# Patient Record
Sex: MALE | Race: WHITE | NOT HISPANIC OR LATINO | URBAN - METROPOLITAN AREA
[De-identification: names, ages, dates, MRNs, and addresses within clinical notes are randomized per-mention and may not be internally consistent; named-entity substitution may affect disease eponyms.]

---

## 2018-06-14 PROBLEM — Z00.00 ENCOUNTER FOR PREVENTIVE HEALTH EXAMINATION: Status: ACTIVE | Noted: 2018-06-14

## 2018-06-28 ENCOUNTER — OUTPATIENT (OUTPATIENT)
Dept: OUTPATIENT SERVICES | Facility: HOSPITAL | Age: 63
LOS: 1 days | End: 2018-06-28
Payer: MEDICAID

## 2018-06-28 VITALS
HEART RATE: 82 BPM | WEIGHT: 217.6 LBS | RESPIRATION RATE: 16 BRPM | SYSTOLIC BLOOD PRESSURE: 142 MMHG | TEMPERATURE: 98 F | HEIGHT: 70 IN | DIASTOLIC BLOOD PRESSURE: 82 MMHG

## 2018-06-28 DIAGNOSIS — Z98.890 OTHER SPECIFIED POSTPROCEDURAL STATES: Chronic | ICD-10-CM

## 2018-06-28 DIAGNOSIS — N32.1 VESICOINTESTINAL FISTULA: ICD-10-CM

## 2018-06-28 DIAGNOSIS — Z29.9 ENCOUNTER FOR PROPHYLACTIC MEASURES, UNSPECIFIED: ICD-10-CM

## 2018-06-28 DIAGNOSIS — Z01.818 ENCOUNTER FOR OTHER PREPROCEDURAL EXAMINATION: ICD-10-CM

## 2018-06-28 LAB
APPEARANCE UR: CLEAR — SIGNIFICANT CHANGE UP
BACTERIA # UR AUTO: ABNORMAL
BILIRUB UR-MCNC: NEGATIVE — SIGNIFICANT CHANGE UP
BLD GP AB SCN SERPL QL: SIGNIFICANT CHANGE UP
COLOR SPEC: YELLOW — SIGNIFICANT CHANGE UP
DIFF PNL FLD: ABNORMAL
EPI CELLS # UR: SIGNIFICANT CHANGE UP
GLUCOSE UR QL: NEGATIVE MG/DL — SIGNIFICANT CHANGE UP
KETONES UR-MCNC: NEGATIVE — SIGNIFICANT CHANGE UP
LEUKOCYTE ESTERASE UR-ACNC: ABNORMAL
NITRITE UR-MCNC: NEGATIVE — SIGNIFICANT CHANGE UP
PH UR: 5 — SIGNIFICANT CHANGE UP (ref 5–8)
PROT UR-MCNC: 15 MG/DL
RBC CASTS # UR COMP ASSIST: ABNORMAL /HPF (ref 0–4)
SP GR SPEC: 1.02 — SIGNIFICANT CHANGE UP (ref 1.01–1.02)
TYPE + AB SCN PNL BLD: SIGNIFICANT CHANGE UP
UROBILINOGEN FLD QL: NEGATIVE MG/DL — SIGNIFICANT CHANGE UP
WBC UR QL: SIGNIFICANT CHANGE UP

## 2018-06-28 PROCEDURE — 86901 BLOOD TYPING SEROLOGIC RH(D): CPT

## 2018-06-28 PROCEDURE — 81001 URINALYSIS AUTO W/SCOPE: CPT

## 2018-06-28 PROCEDURE — 86850 RBC ANTIBODY SCREEN: CPT

## 2018-06-28 PROCEDURE — 86900 BLOOD TYPING SEROLOGIC ABO: CPT

## 2018-06-28 PROCEDURE — 87086 URINE CULTURE/COLONY COUNT: CPT

## 2018-06-28 PROCEDURE — 36415 COLL VENOUS BLD VENIPUNCTURE: CPT

## 2018-06-28 PROCEDURE — G0463: CPT

## 2018-06-28 PROCEDURE — 87186 SC STD MICRODIL/AGAR DIL: CPT

## 2018-06-28 RX ORDER — SODIUM CHLORIDE 9 MG/ML
3 INJECTION INTRAMUSCULAR; INTRAVENOUS; SUBCUTANEOUS EVERY 8 HOURS
Qty: 0 | Refills: 0 | Status: DISCONTINUED | OUTPATIENT
Start: 2018-07-05 | End: 2018-07-07

## 2018-06-28 NOTE — H&P PST ADULT - ASSESSMENT
62 yo male with diverticulitis and colovesical fistula.    CAPRINI SCORE [CLOT]    AGE RELATED RISK FACTORS                                                       MOBILITY RELATED FACTORS  [ ] Age 41-60 years                                            (1 Point)                  [ ] Bed rest                                                        (1 Point)  [ x ] Age: 61-74 years                                           (2 Points)                 [ ] Plaster cast                                                   (2 Points)  [ ] Age= 75 years                                              (3 Points)                 [ ] Bed bound for more than 72 hours                 (2 Points)    DISEASE RELATED RISK FACTORS                                               GENDER SPECIFIC FACTORS  [ ] Edema in the lower extremities                       (1 Point)                  [ ] Pregnancy                                                     (1 Point)  [ ] Varicose veins                                               (1 Point)                  [ ] Post-partum < 6 weeks                                   (1 Point)             [x ] BMI > 25 Kg/m2                                            (1 Point)                  [ ] Hormonal therapy  or oral contraception          (1 Point)                 [ ] Sepsis (in the previous month)                        (1 Point)                  [ ] History of pregnancy complications                 (1 point)  [ ] Pneumonia or serious lung disease                                               [ ] Unexplained or recurrent                     (1 Point)           (in the previous month)                               (1 Point)  [ ] Abnormal pulmonary function test                     (1 Point)                 SURGERY RELATED RISK FACTORS  [ ] Acute myocardial infarction                              (1 Point)                 [ ]  Section                                             (1 Point)  [ ] Congestive heart failure (in the previous month)  (1 Point)               [ ] Minor surgery                                                  (1 Point)   [ x] Inflammatory bowel disease                             (1 Point)                 [ ] Arthroscopic surgery                                        (2 Points)  [ ] Central venous access                                      (2 Points)                [x ] General surgery lasting more than 45 minutes   (2 Points)       [ ] Stroke (in the previous month)                          (5 Points)               [ ] Elective arthroplasty                                         (5 Points)                                                                                                                                               HEMATOLOGY RELATED FACTORS                                                 TRAUMA RELATED RISK FACTORS  [ ] Prior episodes of VTE                                     (3 Points)                 [ ] Fracture of the hip, pelvis, or leg                       (5 Points)  [ ] Positive family history for VTE                         (3 Points)                 [ ] Acute spinal cord injury (in the previous month)  (5 Points)  [ ] Prothrombin 32809 A                                     (3 Points)                 [ ] Paralysis  (less than 1 month)                             (5 Points)  [ ] Factor V Leiden                                             (3 Points)                  [ ] Multiple Trauma within 1 month                        (5 Points)  [ ] Lupus anticoagulants                                     (3 Points)                                                           [ ] Anticardiolipin antibodies                               (3 Points)                                                       [ ] High homocysteine in the blood                      (3 Points)                                             [ ] Other congenital or acquired thrombophilia      (3 Points)                                                [ ] Heparin induced thrombocytopenia                  (3 Points)                                          Total Score [      6    ]

## 2018-06-28 NOTE — H&P PST ADULT - NSANTHOSAYNRD_GEN_A_CORE
No. EULA screening performed.  STOP BANG Legend: 0-2 = LOW Risk; 3-4 = INTERMEDIATE Risk; 5-8 = HIGH Risk

## 2018-06-28 NOTE — PATIENT PROFILE ADULT. - LEARNING ASSESSMENT (PATIENT) ADDITIONAL COMMENTS
Educated on pain scale and management.  Tips for safer surgery and per op instructions given.  Verbalized understanding.

## 2018-06-28 NOTE — H&P PST ADULT - PROBLEM SELECTOR PLAN 1
Laparoscopic possible open resection of colovesical fistula and all related procedures; stents by Dr. Padilla.  Preop medical eval.

## 2018-07-02 ENCOUNTER — APPOINTMENT (OUTPATIENT)
Dept: SURGERY | Facility: CLINIC | Age: 63
End: 2018-07-02

## 2018-07-02 RX ORDER — CEFOTETAN DISODIUM 1 G
2 VIAL (EA) INJECTION ONCE
Qty: 0 | Refills: 0 | Status: DISCONTINUED | OUTPATIENT
Start: 2018-07-05 | End: 2018-07-05

## 2018-07-05 ENCOUNTER — INPATIENT (INPATIENT)
Facility: HOSPITAL | Age: 63
LOS: 1 days | Discharge: HOME | DRG: 674 | End: 2018-07-07
Attending: SURGERY | Admitting: SURGERY
Payer: MEDICAID

## 2018-07-05 ENCOUNTER — RESULT REVIEW (OUTPATIENT)
Age: 63
End: 2018-07-05

## 2018-07-05 VITALS
SYSTOLIC BLOOD PRESSURE: 124 MMHG | RESPIRATION RATE: 16 BRPM | OXYGEN SATURATION: 99 % | HEART RATE: 58 BPM | TEMPERATURE: 98 F | WEIGHT: 218.26 LBS | HEIGHT: 70 IN | DIASTOLIC BLOOD PRESSURE: 74 MMHG

## 2018-07-05 DIAGNOSIS — K57.32 DIVERTICULITIS OF LARGE INTESTINE WITHOUT PERFORATION OR ABSCESS WITHOUT BLEEDING: ICD-10-CM

## 2018-07-05 DIAGNOSIS — Z98.890 OTHER SPECIFIED POSTPROCEDURAL STATES: Chronic | ICD-10-CM

## 2018-07-05 LAB
GLUCOSE BLDC GLUCOMTR-MCNC: 151 MG/DL — HIGH (ref 70–99)
GLUCOSE BLDC GLUCOMTR-MCNC: 167 MG/DL — HIGH (ref 70–99)
GLUCOSE BLDC GLUCOMTR-MCNC: 99 MG/DL — SIGNIFICANT CHANGE UP (ref 70–99)

## 2018-07-05 PROCEDURE — 52005 CYSTO W/URTRL CATHJ: CPT | Mod: 59

## 2018-07-05 PROCEDURE — 88307 TISSUE EXAM BY PATHOLOGIST: CPT | Mod: 26

## 2018-07-05 PROCEDURE — 88305 TISSUE EXAM BY PATHOLOGIST: CPT | Mod: 26

## 2018-07-05 RX ORDER — SODIUM CHLORIDE 9 MG/ML
1000 INJECTION, SOLUTION INTRAVENOUS
Qty: 0 | Refills: 0 | Status: DISCONTINUED | OUTPATIENT
Start: 2018-07-05 | End: 2018-07-07

## 2018-07-05 RX ORDER — GENTAMICIN SULFATE 40 MG/ML
80 VIAL (ML) INJECTION ONCE
Qty: 0 | Refills: 0 | Status: DISCONTINUED | OUTPATIENT
Start: 2018-07-05 | End: 2018-07-05

## 2018-07-05 RX ORDER — ONDANSETRON 8 MG/1
4 TABLET, FILM COATED ORAL EVERY 6 HOURS
Qty: 0 | Refills: 0 | Status: DISCONTINUED | OUTPATIENT
Start: 2018-07-05 | End: 2018-07-07

## 2018-07-05 RX ORDER — ACETAMINOPHEN 500 MG
1000 TABLET ORAL ONCE
Qty: 0 | Refills: 0 | Status: COMPLETED | OUTPATIENT
Start: 2018-07-05 | End: 2018-07-05

## 2018-07-05 RX ORDER — KETOROLAC TROMETHAMINE 30 MG/ML
30 SYRINGE (ML) INJECTION ONCE
Qty: 0 | Refills: 0 | Status: DISCONTINUED | OUTPATIENT
Start: 2018-07-05 | End: 2018-07-05

## 2018-07-05 RX ORDER — HYDROXYZINE HCL 10 MG
50 TABLET ORAL ONCE
Qty: 0 | Refills: 0 | Status: COMPLETED | OUTPATIENT
Start: 2018-07-05 | End: 2018-07-05

## 2018-07-05 RX ORDER — SODIUM CHLORIDE 9 MG/ML
1000 INJECTION, SOLUTION INTRAVENOUS
Qty: 0 | Refills: 0 | Status: DISCONTINUED | OUTPATIENT
Start: 2018-07-05 | End: 2018-07-05

## 2018-07-05 RX ORDER — NALOXONE HYDROCHLORIDE 4 MG/.1ML
0.1 SPRAY NASAL
Qty: 0 | Refills: 0 | Status: DISCONTINUED | OUTPATIENT
Start: 2018-07-05 | End: 2018-07-07

## 2018-07-05 RX ORDER — ALVIMOPAN 12 MG/1
12 CAPSULE ORAL
Qty: 0 | Refills: 0 | Status: DISCONTINUED | OUTPATIENT
Start: 2018-07-05 | End: 2018-07-07

## 2018-07-05 RX ORDER — ONDANSETRON 8 MG/1
4 TABLET, FILM COATED ORAL ONCE
Qty: 0 | Refills: 0 | Status: DISCONTINUED | OUTPATIENT
Start: 2018-07-05 | End: 2018-07-05

## 2018-07-05 RX ORDER — HYDROMORPHONE HYDROCHLORIDE 2 MG/ML
0.5 INJECTION INTRAMUSCULAR; INTRAVENOUS; SUBCUTANEOUS
Qty: 0 | Refills: 0 | Status: DISCONTINUED | OUTPATIENT
Start: 2018-07-05 | End: 2018-07-05

## 2018-07-05 RX ORDER — ENOXAPARIN SODIUM 100 MG/ML
40 INJECTION SUBCUTANEOUS EVERY 24 HOURS
Qty: 0 | Refills: 0 | Status: DISCONTINUED | OUTPATIENT
Start: 2018-07-06 | End: 2018-07-07

## 2018-07-05 RX ORDER — BUPIVACAINE 13.3 MG/ML
20 INJECTION, SUSPENSION, LIPOSOMAL INFILTRATION ONCE
Qty: 0 | Refills: 0 | Status: DISCONTINUED | OUTPATIENT
Start: 2018-07-05 | End: 2018-07-05

## 2018-07-05 RX ORDER — HYDROMORPHONE HYDROCHLORIDE 2 MG/ML
30 INJECTION INTRAMUSCULAR; INTRAVENOUS; SUBCUTANEOUS
Qty: 0 | Refills: 0 | Status: DISCONTINUED | OUTPATIENT
Start: 2018-07-05 | End: 2018-07-06

## 2018-07-05 RX ORDER — CEFOTETAN DISODIUM 1 G
2 VIAL (EA) INJECTION ONCE
Qty: 0 | Refills: 0 | Status: DISCONTINUED | OUTPATIENT
Start: 2018-07-05 | End: 2018-07-05

## 2018-07-05 RX ORDER — DIPHENHYDRAMINE HCL 50 MG
12.5 CAPSULE ORAL EVERY 4 HOURS
Qty: 0 | Refills: 0 | Status: DISCONTINUED | OUTPATIENT
Start: 2018-07-05 | End: 2018-07-07

## 2018-07-05 RX ORDER — ALVIMOPAN 12 MG/1
12 CAPSULE ORAL ONCE
Qty: 0 | Refills: 0 | Status: COMPLETED | OUTPATIENT
Start: 2018-07-05 | End: 2018-07-05

## 2018-07-05 RX ADMIN — HYDROMORPHONE HYDROCHLORIDE 30 MILLILITER(S): 2 INJECTION INTRAMUSCULAR; INTRAVENOUS; SUBCUTANEOUS at 15:06

## 2018-07-05 RX ADMIN — ONDANSETRON 4 MILLIGRAM(S): 8 TABLET, FILM COATED ORAL at 21:46

## 2018-07-05 RX ADMIN — Medication 30 MILLIGRAM(S): at 14:55

## 2018-07-05 RX ADMIN — SODIUM CHLORIDE 3 MILLILITER(S): 9 INJECTION INTRAMUSCULAR; INTRAVENOUS; SUBCUTANEOUS at 20:14

## 2018-07-05 RX ADMIN — HYDROMORPHONE HYDROCHLORIDE 0.5 MILLIGRAM(S): 2 INJECTION INTRAMUSCULAR; INTRAVENOUS; SUBCUTANEOUS at 14:40

## 2018-07-05 RX ADMIN — HYDROMORPHONE HYDROCHLORIDE 0.5 MILLIGRAM(S): 2 INJECTION INTRAMUSCULAR; INTRAVENOUS; SUBCUTANEOUS at 14:50

## 2018-07-05 RX ADMIN — HYDROMORPHONE HYDROCHLORIDE 0.5 MILLIGRAM(S): 2 INJECTION INTRAMUSCULAR; INTRAVENOUS; SUBCUTANEOUS at 15:00

## 2018-07-05 RX ADMIN — Medication 30 MILLIGRAM(S): at 15:10

## 2018-07-05 RX ADMIN — HYDROMORPHONE HYDROCHLORIDE 30 MILLILITER(S): 2 INJECTION INTRAMUSCULAR; INTRAVENOUS; SUBCUTANEOUS at 19:51

## 2018-07-05 RX ADMIN — HYDROMORPHONE HYDROCHLORIDE 0.5 MILLIGRAM(S): 2 INJECTION INTRAMUSCULAR; INTRAVENOUS; SUBCUTANEOUS at 14:45

## 2018-07-05 RX ADMIN — Medication 400 MILLIGRAM(S): at 23:09

## 2018-07-05 RX ADMIN — ALVIMOPAN 12 MILLIGRAM(S): 12 CAPSULE ORAL at 07:21

## 2018-07-05 RX ADMIN — Medication 50 MILLIGRAM(S): at 15:15

## 2018-07-05 NOTE — BRIEF OPERATIVE NOTE - PROCEDURE
<<-----Click on this checkbox to enter Procedure Cystoscopy, with ureteral stent insertion  07/05/2018    Active  [email or web address]

## 2018-07-05 NOTE — CONSULT NOTE ADULT - SUBJECTIVE AND OBJECTIVE BOX
HPI:  52 yo male with history of diverticulitis and and vesicointestinal fistula. Post op today      Home Medications:   · 	Ambien 10 mg oral tablet: Last Dose Taken:  , 1 tab(s) orally once a day (at bedtime)          · 	Adderall 20 mg oral tablet: Last Dose Taken:  , 1 tab(s) orally 2 times a day    PAST MEDICAL & SURGICAL HISTORY:  Diverticulitis  S/P nasal septoplasty    alvimopan 12 milliGRAM(s) Oral two times a day  diphenhydrAMINE   Injectable 12.5 milliGRAM(s) IV Push every 4 hours PRN  HYDROmorphone  Injectable 0.5 milliGRAM(s) IV Push every 10 minutes PRN  HYDROmorphone PCA (1 mG/mL) 30 milliLiter(s) PCA Continuous PCA Continuous  lactated ringers. 1000 milliLiter(s) IV Continuous <Continuous>  naloxone Injectable 0.1 milliGRAM(s) IV Push every 3 minutes PRN  ondansetron Injectable 4 milliGRAM(s) IV Push every 6 hours PRN  ondansetron Injectable 4 milliGRAM(s) IV Push once PRN    MEDICATIONS  (STANDING):  alvimopan 12 milliGRAM(s) Oral two times a day  HYDROmorphone PCA (1 mG/mL) 30 milliLiter(s) PCA Continuous PCA Continuous  lactated ringers. 1000 milliLiter(s) (150 mL/Hr) IV Continuous <Continuous>    MEDICATIONS  (PRN):  diphenhydrAMINE   Injectable 12.5 milliGRAM(s) IV Push every 4 hours PRN Pruritus  HYDROmorphone  Injectable 0.5 milliGRAM(s) IV Push every 10 minutes PRN Moderate Pain (4 - 6)  naloxone Injectable 0.1 milliGRAM(s) IV Push every 3 minutes PRN For ANY of the following changes in patient status:  A. RR LESS THAN 10 breaths per minute, B. Oxygen saturation LESS THAN 90%, C. Sedation score of 6  ondansetron Injectable 4 milliGRAM(s) IV Push every 6 hours PRN Nausea  ondansetron Injectable 4 milliGRAM(s) IV Push once PRN Nausea and/or Vomiting      Allergies    Zosyn (Hives)    Intolerances        SOCIAL HISTORY:  No S/D/IVDU    FAMILY HISTORY:  Family history of breast cancer (Mother)        ROS  - Headache  - Neck Stiffness  - Chest Pain  - SOB  - Abd pain  - Pelvic Pain  - Leg Pain  - Head Ache      Vital Signs Last 24 Hrs  T(C): 36.3 (05 Jul 2018 18:00), Max: 36.8 (05 Jul 2018 06:42)  T(F): 97.3 (05 Jul 2018 18:00), Max: 98.2 (05 Jul 2018 06:42)  HR: 77 (05 Jul 2018 19:20) (53 - 83)  BP: 132/91 (05 Jul 2018 19:20) (111/65 - 141/90)  BP(mean): 86 (05 Jul 2018 17:00) (86 - 86)  RR: 17 (05 Jul 2018 19:20) (10 - 22)  SpO2: 94% (05 Jul 2018 19:20) (94% - 99%)      HEENT: PEARLA  Neck: Supple  Cardio: S1 S2 No Murmur  Pulm: CTA No Rales or Ronchi  Abd: Soft NT ND BS dec, Incision clean  Rectal - refused  Ext: No DCT  Skin: No Rash  Neuro: Awake Pleasant    Diverticulitis - post op pain control and ambulation  BPH - High risk for urinary retention post Robertson withdrawal  Nausea - secondary to opiates and anesthesia HPI:  52 yo male with history of diverticulitis and and vesicointestinal fistula. Post op today      Home Medications:   · 	Ambien 10 mg oral tablet: Last Dose Taken:  , 1 tab(s) orally once a day (at bedtime)          · 	Adderall 20 mg oral tablet: Last Dose Taken:  , 1 tab(s) orally 2 times a day    PAST MEDICAL & SURGICAL HISTORY:  Diverticulitis  S/P nasal septoplasty    alvimopan 12 milliGRAM(s) Oral two times a day  diphenhydrAMINE   Injectable 12.5 milliGRAM(s) IV Push every 4 hours PRN  HYDROmorphone  Injectable 0.5 milliGRAM(s) IV Push every 10 minutes PRN  HYDROmorphone PCA (1 mG/mL) 30 milliLiter(s) PCA Continuous PCA Continuous  lactated ringers. 1000 milliLiter(s) IV Continuous <Continuous>  naloxone Injectable 0.1 milliGRAM(s) IV Push every 3 minutes PRN  ondansetron Injectable 4 milliGRAM(s) IV Push every 6 hours PRN  ondansetron Injectable 4 milliGRAM(s) IV Push once PRN    MEDICATIONS  (STANDING):  alvimopan 12 milliGRAM(s) Oral two times a day  HYDROmorphone PCA (1 mG/mL) 30 milliLiter(s) PCA Continuous PCA Continuous  lactated ringers. 1000 milliLiter(s) (150 mL/Hr) IV Continuous <Continuous>    MEDICATIONS  (PRN):  diphenhydrAMINE   Injectable 12.5 milliGRAM(s) IV Push every 4 hours PRN Pruritus  HYDROmorphone  Injectable 0.5 milliGRAM(s) IV Push every 10 minutes PRN Moderate Pain (4 - 6)  naloxone Injectable 0.1 milliGRAM(s) IV Push every 3 minutes PRN For ANY of the following changes in patient status:  A. RR LESS THAN 10 breaths per minute, B. Oxygen saturation LESS THAN 90%, C. Sedation score of 6  ondansetron Injectable 4 milliGRAM(s) IV Push every 6 hours PRN Nausea  ondansetron Injectable 4 milliGRAM(s) IV Push once PRN Nausea and/or Vomiting      Allergies    Zosyn (Hives)    Intolerances        SOCIAL HISTORY:  No S/D/IVDU    FAMILY HISTORY:  Family history of breast cancer (Mother)        ROS  - Headache  - Neck Stiffness  - Chest Pain  - SOB  mild Abd pain  - Pelvic Pain  - Leg Pain  - Head Ache      Vital Signs Last 24 Hrs  T(C): 36.3 (05 Jul 2018 18:00), Max: 36.8 (05 Jul 2018 06:42)  T(F): 97.3 (05 Jul 2018 18:00), Max: 98.2 (05 Jul 2018 06:42)  HR: 77 (05 Jul 2018 19:20) (53 - 83)  BP: 132/91 (05 Jul 2018 19:20) (111/65 - 141/90)  BP(mean): 86 (05 Jul 2018 17:00) (86 - 86)  RR: 17 (05 Jul 2018 19:20) (10 - 22)  SpO2: 94% (05 Jul 2018 19:20) (94% - 99%)      HEENT: PEARLA  Neck: Supple  Cardio: S1 S2 No Murmur  Pulm: CTA No Rales or Ronchi  Abd: Soft NT ND BS dec, Incision clean  Rectal - refused  Ext: No DCT  Skin: No Rash  Neuro: Awake Pleasant    Diverticulitis - post op pain control and ambulation  BPH - High risk for urinary retention post Robertson withdrawal  Nausea - secondary to opiates and anesthesia

## 2018-07-05 NOTE — BRIEF OPERATIVE NOTE - OPERATION/FINDINGS
Bilateral 6fr ureteral access catheters placed to 25cm, right tagged, left cut obliquely, both individually tied to fofana.
as above

## 2018-07-05 NOTE — PROGRESS NOTE ADULT - ASSESSMENT
63yM, POD 0 Laparoscopic assisted resection of colovesical fistula, sigmoid colon and Low anterior resection. Doing well post-op.  -pain control prn  -CLD, IVF  -continue pca  -dvt ppx - lovenox  -continue to monitor

## 2018-07-05 NOTE — BRIEF OPERATIVE NOTE - PROCEDURE
<<-----Click on this checkbox to enter Procedure Sigmoid colectomy with anastomosis of colon to rectum  07/05/2018  Laparoscopic assisted resection of colovesical fistula,sigmoid colon and Low anterior resection,28 mm EEA anastamosis,Sigmoidoscopy,Mesh hernia repair,BIJU Application  Active  [email or web address]

## 2018-07-05 NOTE — BRIEF OPERATIVE NOTE - POST-OP DX
Colovesical fistula  07/05/2018    Active  Wilmar Temple  Colovesical fistula  07/05/2018    Active  Yo Padilla

## 2018-07-06 DIAGNOSIS — R10.9 UNSPECIFIED ABDOMINAL PAIN: ICD-10-CM

## 2018-07-06 LAB
ANION GAP SERPL CALC-SCNC: 12 MMOL/L — SIGNIFICANT CHANGE UP (ref 5–17)
ANION GAP SERPL CALC-SCNC: 14 MMOL/L — SIGNIFICANT CHANGE UP (ref 5–17)
BLD GP AB SCN SERPL QL: SIGNIFICANT CHANGE UP
BUN SERPL-MCNC: 22 MG/DL — HIGH (ref 8–20)
BUN SERPL-MCNC: 24 MG/DL — HIGH (ref 8–20)
CALCIUM SERPL-MCNC: 8.4 MG/DL — LOW (ref 8.6–10.2)
CALCIUM SERPL-MCNC: 8.6 MG/DL — SIGNIFICANT CHANGE UP (ref 8.6–10.2)
CHLORIDE SERPL-SCNC: 102 MMOL/L — SIGNIFICANT CHANGE UP (ref 98–107)
CHLORIDE SERPL-SCNC: 103 MMOL/L — SIGNIFICANT CHANGE UP (ref 98–107)
CO2 SERPL-SCNC: 23 MMOL/L — SIGNIFICANT CHANGE UP (ref 22–29)
CO2 SERPL-SCNC: 25 MMOL/L — SIGNIFICANT CHANGE UP (ref 22–29)
CREAT SERPL-MCNC: 1.46 MG/DL — HIGH (ref 0.5–1.3)
CREAT SERPL-MCNC: 1.77 MG/DL — HIGH (ref 0.5–1.3)
EOSINOPHIL # BLD AUTO: 0 K/UL — SIGNIFICANT CHANGE UP (ref 0–0.5)
EOSINOPHIL NFR BLD AUTO: 0 % — SIGNIFICANT CHANGE UP (ref 0–5)
GLUCOSE SERPL-MCNC: 106 MG/DL — SIGNIFICANT CHANGE UP (ref 70–115)
GLUCOSE SERPL-MCNC: 134 MG/DL — HIGH (ref 70–115)
HCT VFR BLD CALC: 35.7 % — LOW (ref 42–52)
HGB BLD-MCNC: 11.4 G/DL — LOW (ref 14–18)
LYMPHOCYTES # BLD AUTO: 1 K/UL — SIGNIFICANT CHANGE UP (ref 1–4.8)
LYMPHOCYTES # BLD AUTO: 8.4 % — LOW (ref 20–55)
MCHC RBC-ENTMCNC: 28.6 PG — SIGNIFICANT CHANGE UP (ref 27–31)
MCHC RBC-ENTMCNC: 31.9 G/DL — LOW (ref 32–36)
MCV RBC AUTO: 89.5 FL — SIGNIFICANT CHANGE UP (ref 80–94)
MONOCYTES # BLD AUTO: 1 K/UL — HIGH (ref 0–0.8)
MONOCYTES NFR BLD AUTO: 8.4 % — SIGNIFICANT CHANGE UP (ref 3–10)
NEUTROPHILS # BLD AUTO: 9.6 K/UL — HIGH (ref 1.8–8)
NEUTROPHILS NFR BLD AUTO: 82.9 % — HIGH (ref 37–73)
PLATELET # BLD AUTO: 269 K/UL — SIGNIFICANT CHANGE UP (ref 150–400)
POTASSIUM SERPL-MCNC: 3.9 MMOL/L — SIGNIFICANT CHANGE UP (ref 3.5–5.3)
POTASSIUM SERPL-MCNC: 4.4 MMOL/L — SIGNIFICANT CHANGE UP (ref 3.5–5.3)
POTASSIUM SERPL-SCNC: 3.9 MMOL/L — SIGNIFICANT CHANGE UP (ref 3.5–5.3)
POTASSIUM SERPL-SCNC: 4.4 MMOL/L — SIGNIFICANT CHANGE UP (ref 3.5–5.3)
RBC # BLD: 3.99 M/UL — LOW (ref 4.6–6.2)
RBC # FLD: 13.8 % — SIGNIFICANT CHANGE UP (ref 11–15.6)
SODIUM SERPL-SCNC: 139 MMOL/L — SIGNIFICANT CHANGE UP (ref 135–145)
SODIUM SERPL-SCNC: 140 MMOL/L — SIGNIFICANT CHANGE UP (ref 135–145)
TYPE + AB SCN PNL BLD: SIGNIFICANT CHANGE UP
WBC # BLD: 11.6 K/UL — HIGH (ref 4.8–10.8)
WBC # FLD AUTO: 11.6 K/UL — HIGH (ref 4.8–10.8)

## 2018-07-06 RX ORDER — CALCIUM CARBONATE 500(1250)
2 TABLET ORAL ONCE
Qty: 0 | Refills: 0 | Status: COMPLETED | OUTPATIENT
Start: 2018-07-06 | End: 2018-07-06

## 2018-07-06 RX ORDER — ACETAMINOPHEN 500 MG
650 TABLET ORAL EVERY 6 HOURS
Qty: 0 | Refills: 0 | Status: DISCONTINUED | OUTPATIENT
Start: 2018-07-06 | End: 2018-07-07

## 2018-07-06 RX ORDER — PANTOPRAZOLE SODIUM 20 MG/1
40 TABLET, DELAYED RELEASE ORAL ONCE
Qty: 0 | Refills: 0 | Status: COMPLETED | OUTPATIENT
Start: 2018-07-06 | End: 2018-07-06

## 2018-07-06 RX ORDER — TAMSULOSIN HYDROCHLORIDE 0.4 MG/1
0.4 CAPSULE ORAL AT BEDTIME
Qty: 0 | Refills: 0 | Status: DISCONTINUED | OUTPATIENT
Start: 2018-07-06 | End: 2018-07-07

## 2018-07-06 RX ORDER — ACETAMINOPHEN 500 MG
1000 TABLET ORAL ONCE
Qty: 0 | Refills: 0 | Status: COMPLETED | OUTPATIENT
Start: 2018-07-06 | End: 2018-07-06

## 2018-07-06 RX ORDER — HYDROMORPHONE HYDROCHLORIDE 2 MG/ML
1 INJECTION INTRAMUSCULAR; INTRAVENOUS; SUBCUTANEOUS
Qty: 0 | Refills: 0 | Status: DISCONTINUED | OUTPATIENT
Start: 2018-07-06 | End: 2018-07-07

## 2018-07-06 RX ORDER — PANTOPRAZOLE SODIUM 20 MG/1
40 TABLET, DELAYED RELEASE ORAL
Qty: 0 | Refills: 0 | Status: DISCONTINUED | OUTPATIENT
Start: 2018-07-06 | End: 2018-07-07

## 2018-07-06 RX ORDER — HYDROMORPHONE HYDROCHLORIDE 2 MG/ML
30 INJECTION INTRAMUSCULAR; INTRAVENOUS; SUBCUTANEOUS
Qty: 0 | Refills: 0 | Status: DISCONTINUED | OUTPATIENT
Start: 2018-07-06 | End: 2018-07-07

## 2018-07-06 RX ORDER — LACTOBACILLUS ACIDOPHILUS 100MM CELL
1 CAPSULE ORAL
Qty: 0 | Refills: 0 | Status: DISCONTINUED | OUTPATIENT
Start: 2018-07-06 | End: 2018-07-07

## 2018-07-06 RX ADMIN — ALVIMOPAN 12 MILLIGRAM(S): 12 CAPSULE ORAL at 06:05

## 2018-07-06 RX ADMIN — TAMSULOSIN HYDROCHLORIDE 0.4 MILLIGRAM(S): 0.4 CAPSULE ORAL at 22:15

## 2018-07-06 RX ADMIN — Medication 650 MILLIGRAM(S): at 23:01

## 2018-07-06 RX ADMIN — HYDROMORPHONE HYDROCHLORIDE 30 MILLILITER(S): 2 INJECTION INTRAMUSCULAR; INTRAVENOUS; SUBCUTANEOUS at 07:30

## 2018-07-06 RX ADMIN — SODIUM CHLORIDE 3 MILLILITER(S): 9 INJECTION INTRAMUSCULAR; INTRAVENOUS; SUBCUTANEOUS at 06:06

## 2018-07-06 RX ADMIN — Medication 1 TABLET(S): at 17:56

## 2018-07-06 RX ADMIN — HYDROMORPHONE HYDROCHLORIDE 30 MILLILITER(S): 2 INJECTION INTRAMUSCULAR; INTRAVENOUS; SUBCUTANEOUS at 19:46

## 2018-07-06 RX ADMIN — SODIUM CHLORIDE 150 MILLILITER(S): 9 INJECTION, SOLUTION INTRAVENOUS at 22:15

## 2018-07-06 RX ADMIN — Medication 2 TABLET(S): at 03:07

## 2018-07-06 RX ADMIN — SODIUM CHLORIDE 3 MILLILITER(S): 9 INJECTION INTRAMUSCULAR; INTRAVENOUS; SUBCUTANEOUS at 14:19

## 2018-07-06 RX ADMIN — Medication 1 TABLET(S): at 12:00

## 2018-07-06 RX ADMIN — Medication 650 MILLIGRAM(S): at 22:15

## 2018-07-06 RX ADMIN — SODIUM CHLORIDE 3 MILLILITER(S): 9 INJECTION INTRAMUSCULAR; INTRAVENOUS; SUBCUTANEOUS at 21:26

## 2018-07-06 RX ADMIN — Medication 650 MILLIGRAM(S): at 19:25

## 2018-07-06 RX ADMIN — PANTOPRAZOLE SODIUM 40 MILLIGRAM(S): 20 TABLET, DELAYED RELEASE ORAL at 18:55

## 2018-07-06 RX ADMIN — PANTOPRAZOLE SODIUM 40 MILLIGRAM(S): 20 TABLET, DELAYED RELEASE ORAL at 06:04

## 2018-07-06 RX ADMIN — Medication 1000 MILLIGRAM(S): at 02:27

## 2018-07-06 RX ADMIN — ALVIMOPAN 12 MILLIGRAM(S): 12 CAPSULE ORAL at 18:55

## 2018-07-06 RX ADMIN — Medication 650 MILLIGRAM(S): at 18:55

## 2018-07-06 NOTE — CHART NOTE - NSCHARTNOTEFT_GEN_A_CORE
was called by nurse patient having episodes of desaturation to 88-89% on 4 L.   Upon entering room, patient sleeping, AAOx3 when awoken, patient very comfortably. States he feels he has some chest congestion, and acid reflux which he experiences at home. HOB elevated and had patient take deep breaths and cough. Also states he was scheduled to have sleep study for possible sleep apnea but apt was cancelled and he never followed-up.  Tums ordered   ISS education  HOB elevated    O2 sats improved 97-99% after coughing  Will continue to monitor

## 2018-07-06 NOTE — PROGRESS NOTE ADULT - ASSESSMENT
POD#1 sigmoid colectomy. A&Ox3, NAD, sitting up in chair. Tolerates Lao ices without nausea.    PLAN  Therapy to  be:	[X] Continue   [ ] Discontinued   [ ] Change to prn Analgesics    Documentation and Verification of current medications:  [X] Done	[ ] Not done, not eligible  [ ] Not done, reason not given    [ ]  [facility] Reviewed. Reference #: [id number]

## 2018-07-06 NOTE — PROGRESS NOTE ADULT - PROBLEM SELECTOR PLAN 1
1. Increase demand dose and 4-hour limit on IV PCA; offer 0.5mg once now.  2. IV Tylenol x 1 dose now. Tylenol 650mg PO q6hrs x 2 days thereafter.  3. Oral analgesics once diet tolerated   - Oxycodone IR 5mg PO q3hrs PRN moderate pain   - Oxycodone IR 10mg PO q3hrs PRN severe pain   - Tylenol q6hrs 650mg PO q6hrs PRN mild pain   - Dilaudid 0.5mg IV q3hrs PRN severe pain unrelieved after 1hour  4. Encourage activity as tolerated.  5. Bowel regimen while on opioids  6. NSAIDs at discretion of surgical team

## 2018-07-07 ENCOUNTER — TRANSCRIPTION ENCOUNTER (OUTPATIENT)
Age: 63
End: 2018-07-07

## 2018-07-07 VITALS
OXYGEN SATURATION: 95 % | TEMPERATURE: 99 F | SYSTOLIC BLOOD PRESSURE: 115 MMHG | RESPIRATION RATE: 18 BRPM | DIASTOLIC BLOOD PRESSURE: 71 MMHG | HEART RATE: 82 BPM

## 2018-07-07 LAB
ANION GAP SERPL CALC-SCNC: 12 MMOL/L — SIGNIFICANT CHANGE UP (ref 5–17)
BUN SERPL-MCNC: 21 MG/DL — HIGH (ref 8–20)
CALCIUM SERPL-MCNC: 8.8 MG/DL — SIGNIFICANT CHANGE UP (ref 8.6–10.2)
CHLORIDE SERPL-SCNC: 101 MMOL/L — SIGNIFICANT CHANGE UP (ref 98–107)
CO2 SERPL-SCNC: 27 MMOL/L — SIGNIFICANT CHANGE UP (ref 22–29)
CREAT SERPL-MCNC: 1.26 MG/DL — SIGNIFICANT CHANGE UP (ref 0.5–1.3)
GLUCOSE SERPL-MCNC: 105 MG/DL — SIGNIFICANT CHANGE UP (ref 70–115)
HCT VFR BLD CALC: 35.7 % — LOW (ref 42–52)
HGB BLD-MCNC: 11.3 G/DL — LOW (ref 14–18)
MAGNESIUM SERPL-MCNC: 1.8 MG/DL — SIGNIFICANT CHANGE UP (ref 1.8–2.6)
MCHC RBC-ENTMCNC: 29.3 PG — SIGNIFICANT CHANGE UP (ref 27–31)
MCHC RBC-ENTMCNC: 31.7 G/DL — LOW (ref 32–36)
MCV RBC AUTO: 92.5 FL — SIGNIFICANT CHANGE UP (ref 80–94)
PHOSPHATE SERPL-MCNC: 2.6 MG/DL — SIGNIFICANT CHANGE UP (ref 2.4–4.7)
PLATELET # BLD AUTO: 243 K/UL — SIGNIFICANT CHANGE UP (ref 150–400)
POTASSIUM SERPL-MCNC: 4 MMOL/L — SIGNIFICANT CHANGE UP (ref 3.5–5.3)
POTASSIUM SERPL-SCNC: 4 MMOL/L — SIGNIFICANT CHANGE UP (ref 3.5–5.3)
RBC # BLD: 3.86 M/UL — LOW (ref 4.6–6.2)
RBC # FLD: 13.7 % — SIGNIFICANT CHANGE UP (ref 11–15.6)
SODIUM SERPL-SCNC: 140 MMOL/L — SIGNIFICANT CHANGE UP (ref 135–145)
WBC # BLD: 9.1 K/UL — SIGNIFICANT CHANGE UP (ref 4.8–10.8)
WBC # FLD AUTO: 9.1 K/UL — SIGNIFICANT CHANGE UP (ref 4.8–10.8)

## 2018-07-07 PROCEDURE — 86850 RBC ANTIBODY SCREEN: CPT

## 2018-07-07 PROCEDURE — C1758: CPT

## 2018-07-07 PROCEDURE — 86923 COMPATIBILITY TEST ELECTRIC: CPT

## 2018-07-07 PROCEDURE — 85027 COMPLETE CBC AUTOMATED: CPT

## 2018-07-07 PROCEDURE — 36415 COLL VENOUS BLD VENIPUNCTURE: CPT

## 2018-07-07 PROCEDURE — 83735 ASSAY OF MAGNESIUM: CPT

## 2018-07-07 PROCEDURE — 86901 BLOOD TYPING SEROLOGIC RH(D): CPT

## 2018-07-07 PROCEDURE — C1776: CPT

## 2018-07-07 PROCEDURE — C1781: CPT

## 2018-07-07 PROCEDURE — 84100 ASSAY OF PHOSPHORUS: CPT

## 2018-07-07 PROCEDURE — C1769: CPT

## 2018-07-07 PROCEDURE — 82962 GLUCOSE BLOOD TEST: CPT

## 2018-07-07 PROCEDURE — 88307 TISSUE EXAM BY PATHOLOGIST: CPT

## 2018-07-07 PROCEDURE — 86900 BLOOD TYPING SEROLOGIC ABO: CPT

## 2018-07-07 PROCEDURE — 80048 BASIC METABOLIC PNL TOTAL CA: CPT

## 2018-07-07 PROCEDURE — 88305 TISSUE EXAM BY PATHOLOGIST: CPT

## 2018-07-07 RX ORDER — OXYCODONE AND ACETAMINOPHEN 5; 325 MG/1; MG/1
1 TABLET ORAL EVERY 4 HOURS
Qty: 0 | Refills: 0 | Status: DISCONTINUED | OUTPATIENT
Start: 2018-07-07 | End: 2018-07-07

## 2018-07-07 RX ORDER — OXYCODONE AND ACETAMINOPHEN 5; 325 MG/1; MG/1
2 TABLET ORAL EVERY 4 HOURS
Qty: 0 | Refills: 0 | Status: DISCONTINUED | OUTPATIENT
Start: 2018-07-07 | End: 2018-07-07

## 2018-07-07 RX ORDER — TAMSULOSIN HYDROCHLORIDE 0.4 MG/1
1 CAPSULE ORAL
Qty: 10 | Refills: 0
Start: 2018-07-07 | End: 2018-07-16

## 2018-07-07 RX ORDER — ONDANSETRON 8 MG/1
4 TABLET, FILM COATED ORAL ONCE
Qty: 0 | Refills: 0 | Status: COMPLETED | OUTPATIENT
Start: 2018-07-07 | End: 2018-07-07

## 2018-07-07 RX ORDER — LACTOBACILLUS ACIDOPHILUS 100MM CELL
1 CAPSULE ORAL
Qty: 84 | Refills: 1
Start: 2018-07-07 | End: 2018-08-31

## 2018-07-07 RX ADMIN — HYDROMORPHONE HYDROCHLORIDE 30 MILLILITER(S): 2 INJECTION INTRAMUSCULAR; INTRAVENOUS; SUBCUTANEOUS at 07:03

## 2018-07-07 RX ADMIN — Medication 650 MILLIGRAM(S): at 05:46

## 2018-07-07 RX ADMIN — SODIUM CHLORIDE 3 MILLILITER(S): 9 INJECTION INTRAMUSCULAR; INTRAVENOUS; SUBCUTANEOUS at 05:38

## 2018-07-07 RX ADMIN — ONDANSETRON 4 MILLIGRAM(S): 8 TABLET, FILM COATED ORAL at 14:22

## 2018-07-07 RX ADMIN — HYDROMORPHONE HYDROCHLORIDE 30 MILLILITER(S): 2 INJECTION INTRAMUSCULAR; INTRAVENOUS; SUBCUTANEOUS at 05:47

## 2018-07-07 RX ADMIN — Medication 650 MILLIGRAM(S): at 06:34

## 2018-07-07 RX ADMIN — HYDROMORPHONE HYDROCHLORIDE 1 MILLIGRAM(S): 2 INJECTION INTRAMUSCULAR; INTRAVENOUS; SUBCUTANEOUS at 05:48

## 2018-07-07 RX ADMIN — ENOXAPARIN SODIUM 40 MILLIGRAM(S): 100 INJECTION SUBCUTANEOUS at 05:47

## 2018-07-07 RX ADMIN — Medication 1 TABLET(S): at 08:05

## 2018-07-07 RX ADMIN — PANTOPRAZOLE SODIUM 40 MILLIGRAM(S): 20 TABLET, DELAYED RELEASE ORAL at 08:05

## 2018-07-07 RX ADMIN — ALVIMOPAN 12 MILLIGRAM(S): 12 CAPSULE ORAL at 05:46

## 2018-07-07 NOTE — DISCHARGE NOTE ADULT - NS AS ACTIVITY OBS
Showering allowed/Walking-Outdoors allowed/Do not make important decisions/Do not drive or operate machinery/Walking-Indoors allowed/No Heavy lifting/straining/Stairs allowed

## 2018-07-07 NOTE — DISCHARGE NOTE ADULT - CARE PLAN
Principal Discharge DX:	Colovesical fistula  Goal:	resume normal diet, void normally after fofana removed, resume ADL's  Assessment and plan of treatment:	follow up with Dr Temple for fofana removal after CT cystogram completed

## 2018-07-07 NOTE — PROGRESS NOTE ADULT - SUBJECTIVE AND OBJECTIVE BOX
Hospital Day #    POD # 1 s/p cysto with placement stent;  laparoscopic assisted resection colo-vesicle fistulae and low anterior resection    IV: LR 150cc/hr    I&O's Summary    05 Jul 2018 07:01  -  06 Jul 2018 07:00  --------------------------------------------------------  IN: 1800 mL / OUT: 850 mL / NET: 950 mL    diet: clear liquids- tolerating- denies nausea or vomiting, mild heartburn  ( states hx. reflux ) on ant-acid    Patient: afebrile VSS awake and alert resting in bed mild post operative discomfort ( relief with pain medication )     T(C): 37.4 (07-06-18 @ 07:59), Max: 37.4 (07-06-18 @ 07:59)  HR: 70 (07-06-18 @ 07:59) (53 - 83)  BP: 95/66 (07-06-18 @ 07:59) (94/60 - 149/90)  RR: 18 (07-06-18 @ 07:59) (10 - 22)  SpO2: 94% (07-06-18 @ 07:59) (94% - 100%)  Wt(kg): --    chest: good air exchange, clear BS, denies SOB or chest pain or palpatations   Abdomen: soft, minimally distended, surgical sites ( ports ) clean and dry, BIJU dressing in place dry and stable.  + BS, states past flatus, no BM yet   output: fofana catheter in place adequate output pink urine noted -  ***CONTINUE FOFANA CATHETER *** DO NOT REMOVE ***  Extremities: warm to toes with out calf pain or tenderness, VCD in use OOB and ambulates ok         ***LABS NOTED AND REVIEWED***                      11.4   11.6  )-----------( 269      ( 06 Jul 2018 06:42 )             35.7     07-06    139  |  102  |  24.0<H>  ----------------------------<  134<H>  noting slight elevation bun/creat.(follow)  4.4   |  23.0  |  1.77<H>    Ca    8.4<L>      06 Jul 2018 06:42          xrays:    PAST MEDICAL & SURGICAL HISTORY:  Diverticulitis  S/P nasal septoplasty          Impression: stable post-op day # 1; tolerated surgery well, surgical sites stable and clean and dry. passing flatus, fofana catheter in place ( pink urine noted ) expected . no evidence shanel bleeding.     Plan:  continue present care and management - follow bowel function- continue on clear liquids at present, OOB and ambulate, ***FOFANA TO REMAIN IN PLACE-DO NOT REMOVE***.  follow up routine labs add probiotic
HPI:  52 yo male with history of diverticulitis and and vesicointestinal fistula. (28 Jun 2018 14:53)     Allergies    Zosyn (Hives)    Intolerances      Diverticulitis    MEDICATIONS  (STANDING):  acetaminophen   Tablet. 650 milliGRAM(s) Oral every 6 hours  alvimopan 12 milliGRAM(s) Oral two times a day  enoxaparin Injectable 40 milliGRAM(s) SubCutaneous every 24 hours  HYDROmorphone PCA (1 mG/mL) 30 milliLiter(s) PCA Continuous PCA Continuous  lactated ringers. 1000 milliLiter(s) (150 mL/Hr) IV Continuous <Continuous>  lactobacillus acidophilus 1 Tablet(s) Oral three times a day with meals  pantoprazole    Tablet 40 milliGRAM(s) Oral before breakfast  sodium chloride 0.9% lock flush 3 milliLiter(s) IV Push every 8 hours  tamsulosin 0.4 milliGRAM(s) Oral at bedtime    MEDICATIONS  (PRN):  diphenhydrAMINE   Injectable 12.5 milliGRAM(s) IV Push every 4 hours PRN Pruritus  HYDROmorphone PCA (1 mG/mL) Rescue Clinician Bolus 1 milliGRAM(s) IV Push every 15 minutes PRN for Pain Scale GREATER THAN 6  naloxone Injectable 0.1 milliGRAM(s) IV Push every 3 minutes PRN For ANY of the following changes in patient status:  A. RR LESS THAN 10 breaths per minute, B. Oxygen saturation LESS THAN 90%, C. Sedation score of 6  ondansetron Injectable 4 milliGRAM(s) IV Push every 6 hours PRN Nausea                           11.4   11.6  )-----------( 269      ( 06 Jul 2018 06:42 )             35.7     07-06    139  |  102  |  24.0<H>  ----------------------------<  134<H>  4.4   |  23.0  |  1.77<H>    Ca    8.4<L>      06 Jul 2018 06:42        ;  Vital Signs Last 24 Hrs  T(C): 36.7 (06 Jul 2018 16:26), Max: 37.4 (06 Jul 2018 07:59)  T(F): 98 (06 Jul 2018 16:26), Max: 99.4 (06 Jul 2018 07:59)  HR: 71 (06 Jul 2018 16:26) (59 - 76)  BP: 109/75 (06 Jul 2018 16:26) (94/60 - 149/90)  BP(mean): --  RR: 18 (06 Jul 2018 16:26) (18 - 18)  SpO2: 96% (06 Jul 2018 16:26) (94% - 100%)  CAPILLARY BLOOD GLUCOSE      07-05 @ 07:01  -  07-06 @ 07:00  --------------------------------------------------------  IN: 1800 mL / OUT: 850 mL / NET: 950 mL    07-06 @ 07:01  -  07-06 @ 19:37  --------------------------------------------------------  IN: 0 mL / OUT: 250 mL / NET: -250 mL      Patient feeling better No CP, No SOB, No N/V Mod pain Tolerating clears  HEENT: PEARLA  Neck: Supple  Cardio: S1 S2 No Murmur  Pulm: CTA No Rales or Ronchi  Abd: Soft NT ND BS+, Incision clean  Rectal - refused  Ext: No DCT  Skin: No Rash  Neuro: Awake Pleasant    Diverticulitis - post op pain control and ambulation  BPH - High risk for urinary retention post Fofana withdrawal, fofana in place for 1 weeks  Nausea - secondary to opiates and anesthesia improved  Acute on Chronic Renal Failure - avoid NSAIDS
HPI:  52 yo male with history of diverticulitis and and vesicointestinal fistula. (28 Jun 2018 14:53)  Now POD#1 Sigmoid colectomy with anastomosis of colon to rectum.      VERBAL REPORT:  "I have been pressing, but the pain is still there. I'm sitting up because  it's worse when I'm lying down."  Patient describes sharp incisional pain unrelieved by IV PCA demand or IV Tylenol given overnight. He reports feeling as if gas is stuck in his epigastric area, small amount of flatus this morning.  PAIN SCORE:  6-7/10  (VNRS)      Allergies  Zosyn (Hives)      THERAPY:  [ ] IV PCA Morphine		[ ] 5 mg/mL	[ ] 1 mg/mL  [X] IV PCA Hydromorphone	[ ] 5 mg/mL	[X] 1 mg/mL  [ ] IV PCA Fentanyl		[ ] 50 micrograms/mL    Demand dose _0.2mg_ lockout _6_ (minutes) Continuous Rate _0_ Total: _11.6mg_  Daily      MEDICATIONS  (STANDING):  acetaminophen   Tablet. 650 milliGRAM(s) Oral every 6 hours  acetaminophen  IVPB. 1000 milliGRAM(s) IV Intermittent once  alvimopan 12 milliGRAM(s) Oral two times a day  enoxaparin Injectable 40 milliGRAM(s) SubCutaneous every 24 hours  HYDROmorphone PCA (1 mG/mL) 30 milliLiter(s) PCA Continuous PCA Continuous  lactated ringers. 1000 milliLiter(s) (150 mL/Hr) IV Continuous <Continuous>  lactobacillus acidophilus 1 Tablet(s) Oral three times a day with meals  pantoprazole    Tablet 40 milliGRAM(s) Oral before breakfast  sodium chloride 0.9% lock flush 3 milliLiter(s) IV Push every 8 hours  tamsulosin 0.4 milliGRAM(s) Oral at bedtime    MEDICATIONS  (PRN):  diphenhydrAMINE   Injectable 12.5 milliGRAM(s) IV Push every 4 hours PRN Pruritus  HYDROmorphone PCA (1 mG/mL) Rescue Clinician Bolus 1 milliGRAM(s) IV Push every 15 minutes PRN for Pain Scale GREATER THAN 6  naloxone Injectable 0.1 milliGRAM(s) IV Push every 3 minutes PRN For ANY of the following changes in patient status:  A. RR LESS THAN 10 breaths per minute, B. Oxygen saturation LESS THAN 90%, C. Sedation score of 6  ondansetron Injectable 4 milliGRAM(s) IV Push every 6 hours PRN Nausea        OBJECTIVE:  Sedation Score:	[X] Alert	[ ] Drowsy	[ ] Arousable	[ ] Asleep	[ ] Unresponsive  Side Effects:	[X] None	[ ] Nausea	[ ] Vomiting	[ ] Pruritus	  [ ] Weakness		[ ] Numbness	[ ] Other:      PHYSICAL EXAM:  GENERAL: NAD, well-groomed, well-developed  HEAD:  Atraumatic, Normocephalic  EYES: EOMI, PERRLA, conjunctiva and sclera clear  NERVOUS SYSTEM:  Alert & Oriented X3, Good concentration; Motor Strength 5/5 B/L upper and lower extremities  ABDOMEN: Tender, Distended      LABS:                        11.4   11.6  )-----------( 269      ( 06 Jul 2018 06:42 )             35.7       07-06    139  |  102  |  24.0<H>  ----------------------------<  134<H>  4.4   |  23.0  |  1.77<H>    Ca    8.4<L>      06 Jul 2018 06:42
POD #2 s/p sigmoid colectomy  Awake, alert, pleasant and comfortable with low-level PCA use.   Tolerating full fluids.  Pending d/c home later today.  Impression:  Acute postoperative pain  Plan:  d/c PCA, start percocet prn pain.           Sign off.  -Margot FNP-C
Post-op Check    Subjective:  Pt offers no acute complaints at this time. Pain well controlled on current regiment. Denies chest pain and SOB. + fofana.     STATUS POST:   Laparoscopic assisted resection of colovesical fistula, sigmoid colon and Low anterior resection    POST OPERATIVE DAY #: 0    MEDICATIONS  (STANDING):  alvimopan 12 milliGRAM(s) Oral two times a day  HYDROmorphone PCA (1 mG/mL) 30 milliLiter(s) PCA Continuous PCA Continuous  lactated ringers. 1000 milliLiter(s) (150 mL/Hr) IV Continuous <Continuous>  sodium chloride 0.9% lock flush 3 milliLiter(s) IV Push every 8 hours    MEDICATIONS  (PRN):  diphenhydrAMINE   Injectable 12.5 milliGRAM(s) IV Push every 4 hours PRN Pruritus  naloxone Injectable 0.1 milliGRAM(s) IV Push every 3 minutes PRN For ANY of the following changes in patient status:  A. RR LESS THAN 10 breaths per minute, B. Oxygen saturation LESS THAN 90%, C. Sedation score of 6  ondansetron Injectable 4 milliGRAM(s) IV Push every 6 hours PRN Nausea      Vital Signs Last 24 Hrs  T(C): 36.6 (05 Jul 2018 19:54), Max: 36.8 (05 Jul 2018 06:42)  T(F): 97.8 (05 Jul 2018 19:54), Max: 98.2 (05 Jul 2018 06:42)  HR: 76 (05 Jul 2018 19:54) (53 - 83)  BP: 149/90 (05 Jul 2018 19:54) (111/65 - 149/90)  BP(mean): 86 (05 Jul 2018 17:00) (86 - 86)  RR: 18 (05 Jul 2018 19:54) (10 - 22)  SpO2: 97% (05 Jul 2018 19:54) (94% - 99%)    Physical Exam:    Constitutional: NAD  HEENT: PERRL, EOMI  Neck: No JVD, FROM without pain  Respiratory: no accessory muscle use, non-labored  Cardio: normal s1/s2  Neurological: A&O x 3; without gross deficit  Abdomen: soft, appropriately TTP, non-distended. BIJU dressing in place.
Pt seen, chart reviewed.  S/p Laparoscopic Resection of colovesical Fistula, Umbilical Hernia Repair with Mesh, POD#1.  VSS.  Adequate pain control with PCA.  Resting comfortably.   Tolerating Clears.  No N/V.    No anesthesia problems noted.
SURGICAL PA NOTE:     STATUS POST:      POST OPERATIVE DAY #:     Vital Signs Last 24 Hrs  T(C): 36.4 (07 Jul 2018 05:35), Max: 37.4 (06 Jul 2018 07:59)  T(F): 97.6 (07 Jul 2018 05:35), Max: 99.4 (06 Jul 2018 07:59)  HR: 75 (07 Jul 2018 05:35) (69 - 75)  BP: 107/68 (07 Jul 2018 05:35) (95/66 - 109/75)  BP(mean): --  RR: 18 (07 Jul 2018 05:35) (18 - 18)  SpO2: 95% (07 Jul 2018 05:35) (93% - 96%)    HPI:  54 yo male with history of diverticulitis and and vesicointestinal fistula. (28 Jun 2018 14:53)      Diverticulitis of large intestine without perforation or abscess without bleeding  Family history of breast cancer (Mother)  Handoff  MEWS Score  Diverticulitis  Colovesical fistula  Colovesical fistula  Colovesical fistula  Colovesical fistula  Postoperative abdominal pain  Need for prophylactic measure  Colovesical fistula  Sigmoid colectomy with anastomosis of colon to rectum  Cystoscopy, with ureteral stent insertion  S/P nasal septoplasty  DVTRCLI OF LG INT W/O PERFORAT      SUBJECTIVE: Pt seen lying supine with HOB up, c/o incisonal discomfort, still using PCA, OOB amb ok, fofana indwelling, adriana po clears, no NV    Diet: clears    Activity:     Fevers: [ ]Yes [x ]NO  Chills: [ ] Yes [x ] NO  SOB:  [ ] YES [x ] NO  Dyspnea: [ ]YES [ x]NO  Chest Discomfort: [ ] YES [x ] NO    Nausea: [ ] YES [x ] NO           Vomiting: [ ] YES [x ] NO  Flatus: [ x] YES [ ] NO             Bowel Movement: [ ] YES [ x] NO  Diarrhea: [ ] YES [ ] NO         Void: [ ]YES [ ]No  Constipation: [ ] YES [ ] NO       Pain (0-10):              Pain Control Adequate: [x ] YES [ ] NO    Fofana: indwelling    NGT:      I&O's Detail    05 Jul 2018 07:01  -  06 Jul 2018 07:00  --------------------------------------------------------  IN:    lactated ringers.: 1800 mL  Total IN: 1800 mL    OUT:    Indwelling Catheter - Urethral: 850 mL  Total OUT: 850 mL    Total NET: 950 mL      06 Jul 2018 07:01  -  07 Jul 2018 06:56  --------------------------------------------------------  IN:    lactated ringers.: 1950 mL  Total IN: 1950 mL    OUT:    Indwelling Catheter - Urethral: 1685 mL  Total OUT: 1685 mL    Total NET: 265 mL        I&O's Summary    05 Jul 2018 07:01  -  06 Jul 2018 07:00  --------------------------------------------------------  IN: 1800 mL / OUT: 850 mL / NET: 950 mL    06 Jul 2018 07:01  -  07 Jul 2018 06:56  --------------------------------------------------------  IN: 1950 mL / OUT: 1685 mL / NET: 265 mL          MEDICATIONS  (STANDING):  acetaminophen   Tablet. 650 milliGRAM(s) Oral every 6 hours  alvimopan 12 milliGRAM(s) Oral two times a day  enoxaparin Injectable 40 milliGRAM(s) SubCutaneous every 24 hours  HYDROmorphone PCA (1 mG/mL) 30 milliLiter(s) PCA Continuous PCA Continuous  lactated ringers. 1000 milliLiter(s) (150 mL/Hr) IV Continuous <Continuous>  lactobacillus acidophilus 1 Tablet(s) Oral three times a day with meals  pantoprazole    Tablet 40 milliGRAM(s) Oral before breakfast  sodium chloride 0.9% lock flush 3 milliLiter(s) IV Push every 8 hours  tamsulosin 0.4 milliGRAM(s) Oral at bedtime    MEDICATIONS  (PRN):  diphenhydrAMINE   Injectable 12.5 milliGRAM(s) IV Push every 4 hours PRN Pruritus  HYDROmorphone PCA (1 mG/mL) Rescue Clinician Bolus 1 milliGRAM(s) IV Push every 15 minutes PRN for Pain Scale GREATER THAN 6  naloxone Injectable 0.1 milliGRAM(s) IV Push every 3 minutes PRN For ANY of the following changes in patient status:  A. RR LESS THAN 10 breaths per minute, B. Oxygen saturation LESS THAN 90%, C. Sedation score of 6  ondansetron Injectable 4 milliGRAM(s) IV Push every 6 hours PRN Nausea      LABS:                        11.4   11.6  )-----------( 269      ( 06 Jul 2018 06:42 )             35.7     07-06    140  |  103  |  22.0<H>  ----------------------------<  106  3.9   |  25.0  |  1.46<H>    Ca    8.6      06 Jul 2018 20:26              RADIOLOGY & ADDITIONAL STUDIES:
passed flatus multiple  times ,dressing in tact ,Hematuria improved ,,advance diet to full liquids and plan home PM today ,Home with fofana and OPD CT Cystogram next week.
s/p cysto bilateral ureteral catheter placement for  laparoscopic assisted colovesical fistula repair, POD#1  catheters removed perioperatively  fofana in place draining light tinged urine  Afebrile, VSS                        11.4   11.6  )-----------( 269      ( 06 Jul 2018 06:42 )             35.7   07-06    139  |  102  |  24.0<H>  ----------------------------<  134<H> ?baseline  4.4   |  23.0  |  1.77<H>    Ca    8.4<L>      06 Jul 2018 06:42

## 2018-07-07 NOTE — DISCHARGE NOTE ADULT - PLAN OF CARE
resume normal diet, void normally after fofana removed, resume ADL's follow up with Dr Temple for fofana removal after CT cystogram completed

## 2018-07-07 NOTE — DISCHARGE NOTE ADULT - MEDICATION SUMMARY - MEDICATIONS TO TAKE
I will START or STAY ON the medications listed below when I get home from the hospital:    Percocet 5/325 oral tablet  -- 1 tab(s) by mouth every 6 hours MDD:4  -- Caution federal law prohibits the transfer of this drug to any person other  than the person for whom it was prescribed.  May cause drowsiness.  Alcohol may intensify this effect.  Use care when operating dangerous machinery.  This prescription cannot be refilled.  This product contains acetaminophen.  Do not use  with any other product containing acetaminophen to prevent possible liver damage.  Using more of this medication than prescribed may cause serious breathing problems.    -- Indication: For Pain    tamsulosin 0.4 mg oral capsule  -- 1 cap(s) by mouth once a day (at bedtime) MDD:1  -- Indication: For bladder    Ambien 10 mg oral tablet  -- 1 tab(s) by mouth once a day (at bedtime)  -- Indication: For home med    Adderall 20 mg oral tablet  -- 1 tab(s) by mouth 2 times a day  -- Indication: For home med I will START or STAY ON the medications listed below when I get home from the hospital:    Percocet 5/325 oral tablet  -- 1 tab(s) by mouth every 6 hours MDD:4  -- Caution federal law prohibits the transfer of this drug to any person other  than the person for whom it was prescribed.  May cause drowsiness.  Alcohol may intensify this effect.  Use care when operating dangerous machinery.  This prescription cannot be refilled.  This product contains acetaminophen.  Do not use  with any other product containing acetaminophen to prevent possible liver damage.  Using more of this medication than prescribed may cause serious breathing problems.    -- Indication: For Pain    tamsulosin 0.4 mg oral capsule  -- 1 cap(s) by mouth once a day (at bedtime) MDD:1  -- Indication: For bladder    Ambien 10 mg oral tablet  -- 1 tab(s) by mouth once a day (at bedtime)  -- Indication: For home med    Adderall 20 mg oral tablet  -- 1 tab(s) by mouth 2 times a day  -- Indication: For home med    lactobacillus acidophilus oral capsule  -- 1 cap(s) by mouth 3 times a day MDD:3  -- Indication: For for GI tract

## 2018-07-07 NOTE — DISCHARGE NOTE ADULT - PATIENT PORTAL LINK FT
You can access the [facility] Patient Portal, offered by [facility], by registering with the following website: [email or web address]

## 2018-07-07 NOTE — PROGRESS NOTE ADULT - PROBLEM SELECTOR PLAN 1
cont PCA, Benadryl for itching, cont OOB amb, adv diet, keep fofana indwelling x 7- 10 days , plan per Dr Temple cont PCA, Benadryl for itching, cont OOB amb, adv diet, chk labs, keep fofana indwelling x 7- 10 days , plan per Dr Temple

## 2018-07-07 NOTE — DISCHARGE NOTE ADULT - HOSPITAL COURSE
62 yo male with history of diverticulitis and and colovesical fistula, hx UTI's x 1 year presented to [facility] for elective laparoscopic sigmoid resection and repair of colovesical fistula in 7/5 by Dr Temple.    Pt now POD#2: adriana po well, min c/o abd discomfort, OOB amb ok, + flatus, fofana indwelliing ( for 1 week+)    Pt seen and examined by Dr Temple 7/7 and approved for discharge home later today.

## 2018-07-07 NOTE — DISCHARGE NOTE ADULT - CARE PROVIDER_API CALL
Wilmar Temple (MD), Surgery  [address] [place] [address]  Phone: [phone]  Fax: [phone]

## 2018-07-18 LAB — SURGICAL PATHOLOGY FINAL REPORT - CH: SIGNIFICANT CHANGE UP

## 2018-07-31 NOTE — PATIENT PROFILE ADULT. - PAIN SCALE PREFERRED, PROFILE
numerical 0-10
< from: Xray Chest 1 View- PORTABLE-Urgent (07.29.18 @ 05:41) >  INTERPRETATION:  Clinical history: 67-year-old male, shortness of breath.    Three portable views without comparison demonstrate a normal cardiac   silhouette and normal pulmonary vasculature with no consolidation,   effusion, gross adenopathy, pneumothorax or osseous finding.    Impression    No acute radiographic findings    < end of copied text >

## 2019-10-03 ENCOUNTER — INPATIENT (INPATIENT)
Facility: HOSPITAL | Age: 64
LOS: 2 days | Discharge: HOME | DRG: 299 | End: 2019-10-06
Attending: INTERNAL MEDICINE | Admitting: STUDENT IN AN ORGANIZED HEALTH CARE EDUCATION/TRAINING PROGRAM
Payer: MEDICAID

## 2019-10-03 VITALS
DIASTOLIC BLOOD PRESSURE: 77 MMHG | TEMPERATURE: 98 F | WEIGHT: 225.09 LBS | OXYGEN SATURATION: 99 % | HEIGHT: 70 IN | HEART RATE: 71 BPM | SYSTOLIC BLOOD PRESSURE: 132 MMHG | RESPIRATION RATE: 18 BRPM

## 2019-10-03 DIAGNOSIS — Z98.890 OTHER SPECIFIED POSTPROCEDURAL STATES: Chronic | ICD-10-CM

## 2019-10-03 DIAGNOSIS — I82.409 ACUTE EMBOLISM AND THROMBOSIS OF UNSPECIFIED DEEP VEINS OF UNSPECIFIED LOWER EXTREMITY: ICD-10-CM

## 2019-10-03 PROBLEM — K57.92 DIVERTICULITIS OF INTESTINE, PART UNSPECIFIED, WITHOUT PERFORATION OR ABSCESS WITHOUT BLEEDING: Chronic | Status: ACTIVE | Noted: 2018-06-28

## 2019-10-03 LAB
ALBUMIN SERPL ELPH-MCNC: 3.9 G/DL — SIGNIFICANT CHANGE UP (ref 3.3–5.2)
ALP SERPL-CCNC: 96 U/L — SIGNIFICANT CHANGE UP (ref 40–120)
ALT FLD-CCNC: 17 U/L — SIGNIFICANT CHANGE UP
ANION GAP SERPL CALC-SCNC: 12 MMOL/L — SIGNIFICANT CHANGE UP (ref 5–17)
APTT BLD: 36.5 SEC — HIGH (ref 27.5–36.3)
AST SERPL-CCNC: 15 U/L — SIGNIFICANT CHANGE UP
BASE EXCESS BLDA CALC-SCNC: 1.1 MMOL/L — SIGNIFICANT CHANGE UP (ref -2–2)
BASE EXCESS BLDV CALC-SCNC: 1.7 MMOL/L — SIGNIFICANT CHANGE UP (ref -2–2)
BASOPHILS # BLD AUTO: 0.05 K/UL — SIGNIFICANT CHANGE UP (ref 0–0.2)
BASOPHILS NFR BLD AUTO: 0.6 % — SIGNIFICANT CHANGE UP (ref 0–2)
BILIRUB SERPL-MCNC: 0.5 MG/DL — SIGNIFICANT CHANGE UP (ref 0.4–2)
BLOOD GAS COMMENTS ARTERIAL: SIGNIFICANT CHANGE UP
BUN SERPL-MCNC: 23 MG/DL — HIGH (ref 8–20)
CA-I SERPL-SCNC: 1.11 MMOL/L — LOW (ref 1.15–1.33)
CALCIUM SERPL-MCNC: 9 MG/DL — SIGNIFICANT CHANGE UP (ref 8.6–10.2)
CHLORIDE BLDV-SCNC: 107 MMOL/L — SIGNIFICANT CHANGE UP (ref 98–107)
CHLORIDE SERPL-SCNC: 106 MMOL/L — SIGNIFICANT CHANGE UP (ref 98–107)
CO2 SERPL-SCNC: 25 MMOL/L — SIGNIFICANT CHANGE UP (ref 22–29)
CREAT SERPL-MCNC: 1.45 MG/DL — HIGH (ref 0.5–1.3)
D DIMER BLD IA.RAPID-MCNC: 1411 NG/ML DDU — HIGH
EOSINOPHIL # BLD AUTO: 0.32 K/UL — SIGNIFICANT CHANGE UP (ref 0–0.5)
EOSINOPHIL NFR BLD AUTO: 4.1 % — SIGNIFICANT CHANGE UP (ref 0–6)
GAS PNL BLDA: SIGNIFICANT CHANGE UP
GAS PNL BLDV: 144 MMOL/L — SIGNIFICANT CHANGE UP (ref 135–145)
GAS PNL BLDV: SIGNIFICANT CHANGE UP
GAS PNL BLDV: SIGNIFICANT CHANGE UP
GLUCOSE BLDV-MCNC: 86 MG/DL — SIGNIFICANT CHANGE UP (ref 70–99)
GLUCOSE SERPL-MCNC: 92 MG/DL — SIGNIFICANT CHANGE UP (ref 70–115)
HCO3 BLDA-SCNC: 25 MMOL/L — SIGNIFICANT CHANGE UP (ref 20–26)
HCO3 BLDV-SCNC: 25 MMOL/L — SIGNIFICANT CHANGE UP (ref 20–26)
HCT VFR BLD CALC: 43.1 % — SIGNIFICANT CHANGE UP (ref 39–50)
HCT VFR BLDA CALC: 45 — SIGNIFICANT CHANGE UP (ref 39–50)
HGB BLD CALC-MCNC: 14.6 G/DL — SIGNIFICANT CHANGE UP (ref 13–17)
HGB BLD-MCNC: 13.7 G/DL — SIGNIFICANT CHANGE UP (ref 13–17)
HOROWITZ INDEX BLDA+IHG-RTO: 0.21 — SIGNIFICANT CHANGE UP
IMM GRANULOCYTES NFR BLD AUTO: 0.3 % — SIGNIFICANT CHANGE UP (ref 0–1.5)
INR BLD: 1.08 RATIO — SIGNIFICANT CHANGE UP (ref 0.88–1.16)
LACTATE BLDV-MCNC: 1 MMOL/L — SIGNIFICANT CHANGE UP (ref 0.5–2)
LYMPHOCYTES # BLD AUTO: 1.51 K/UL — SIGNIFICANT CHANGE UP (ref 1–3.3)
LYMPHOCYTES # BLD AUTO: 19.2 % — SIGNIFICANT CHANGE UP (ref 13–44)
MCHC RBC-ENTMCNC: 29.5 PG — SIGNIFICANT CHANGE UP (ref 27–34)
MCHC RBC-ENTMCNC: 31.8 GM/DL — LOW (ref 32–36)
MCV RBC AUTO: 92.9 FL — SIGNIFICANT CHANGE UP (ref 80–100)
MONOCYTES # BLD AUTO: 0.75 K/UL — SIGNIFICANT CHANGE UP (ref 0–0.9)
MONOCYTES NFR BLD AUTO: 9.5 % — SIGNIFICANT CHANGE UP (ref 2–14)
NEUTROPHILS # BLD AUTO: 5.23 K/UL — SIGNIFICANT CHANGE UP (ref 1.8–7.4)
NEUTROPHILS NFR BLD AUTO: 66.3 % — SIGNIFICANT CHANGE UP (ref 43–77)
OTHER CELLS CSF MANUAL: 15 ML/DL — LOW (ref 18–22)
PCO2 BLDA: 39 MMHG — SIGNIFICANT CHANGE UP (ref 35–45)
PCO2 BLDV: 53 MMHG — HIGH (ref 35–50)
PH BLDA: 7.42 — SIGNIFICANT CHANGE UP (ref 7.35–7.45)
PH BLDV: 7.34 — SIGNIFICANT CHANGE UP (ref 7.32–7.43)
PLATELET # BLD AUTO: 226 K/UL — SIGNIFICANT CHANGE UP (ref 150–400)
PO2 BLDA: 80 MMHG — LOW (ref 83–108)
PO2 BLDV: 43 MMHG — SIGNIFICANT CHANGE UP (ref 25–45)
POTASSIUM BLDV-SCNC: 4.6 MMOL/L — HIGH (ref 3.4–4.5)
POTASSIUM SERPL-MCNC: 4.5 MMOL/L — SIGNIFICANT CHANGE UP (ref 3.5–5.3)
POTASSIUM SERPL-SCNC: 4.5 MMOL/L — SIGNIFICANT CHANGE UP (ref 3.5–5.3)
PROT SERPL-MCNC: 6.7 G/DL — SIGNIFICANT CHANGE UP (ref 6.6–8.7)
PROTHROM AB SERPL-ACNC: 12.4 SEC — SIGNIFICANT CHANGE UP (ref 10–12.9)
RBC # BLD: 4.64 M/UL — SIGNIFICANT CHANGE UP (ref 4.2–5.8)
RBC # FLD: 13.3 % — SIGNIFICANT CHANGE UP (ref 10.3–14.5)
SAO2 % BLDA: 97 % — SIGNIFICANT CHANGE UP (ref 95–99)
SAO2 % BLDV: 76 % — SIGNIFICANT CHANGE UP
SODIUM SERPL-SCNC: 143 MMOL/L — SIGNIFICANT CHANGE UP (ref 135–145)
WBC # BLD: 7.88 K/UL — SIGNIFICANT CHANGE UP (ref 3.8–10.5)
WBC # FLD AUTO: 7.88 K/UL — SIGNIFICANT CHANGE UP (ref 3.8–10.5)

## 2019-10-03 PROCEDURE — 99285 EMERGENCY DEPT VISIT HI MDM: CPT

## 2019-10-03 PROCEDURE — 99223 1ST HOSP IP/OBS HIGH 75: CPT

## 2019-10-03 PROCEDURE — 93971 EXTREMITY STUDY: CPT | Mod: 26,RT

## 2019-10-03 PROCEDURE — 93010 ELECTROCARDIOGRAM REPORT: CPT

## 2019-10-03 PROCEDURE — 99221 1ST HOSP IP/OBS SF/LOW 40: CPT

## 2019-10-03 PROCEDURE — 71275 CT ANGIOGRAPHY CHEST: CPT | Mod: 26

## 2019-10-03 RX ORDER — HEPARIN SODIUM 5000 [USP'U]/ML
8500 INJECTION INTRAVENOUS; SUBCUTANEOUS EVERY 6 HOURS
Refills: 0 | Status: DISCONTINUED | OUTPATIENT
Start: 2019-10-03 | End: 2019-10-06

## 2019-10-03 RX ORDER — ENOXAPARIN SODIUM 100 MG/ML
100 INJECTION SUBCUTANEOUS ONCE
Refills: 0 | Status: COMPLETED | OUTPATIENT
Start: 2019-10-03 | End: 2019-10-03

## 2019-10-03 RX ORDER — HEPARIN SODIUM 5000 [USP'U]/ML
INJECTION INTRAVENOUS; SUBCUTANEOUS
Qty: 25000 | Refills: 0 | Status: DISCONTINUED | OUTPATIENT
Start: 2019-10-03 | End: 2019-10-04

## 2019-10-03 RX ORDER — ZOLPIDEM TARTRATE 10 MG/1
5 TABLET ORAL AT BEDTIME
Refills: 0 | Status: DISCONTINUED | OUTPATIENT
Start: 2019-10-03 | End: 2019-10-06

## 2019-10-03 RX ORDER — HEPARIN SODIUM 5000 [USP'U]/ML
4000 INJECTION INTRAVENOUS; SUBCUTANEOUS EVERY 6 HOURS
Refills: 0 | Status: DISCONTINUED | OUTPATIENT
Start: 2019-10-03 | End: 2019-10-06

## 2019-10-03 RX ORDER — ACETAMINOPHEN 500 MG
650 TABLET ORAL ONCE
Refills: 0 | Status: COMPLETED | OUTPATIENT
Start: 2019-10-03 | End: 2019-10-03

## 2019-10-03 RX ADMIN — ENOXAPARIN SODIUM 100 MILLIGRAM(S): 100 INJECTION SUBCUTANEOUS at 19:14

## 2019-10-03 RX ADMIN — ZOLPIDEM TARTRATE 5 MILLIGRAM(S): 10 TABLET ORAL at 23:15

## 2019-10-03 RX ADMIN — Medication 650 MILLIGRAM(S): at 19:14

## 2019-10-03 RX ADMIN — HEPARIN SODIUM 1800 UNIT(S)/HR: 5000 INJECTION INTRAVENOUS; SUBCUTANEOUS at 23:05

## 2019-10-03 NOTE — CONSULT NOTE ADULT - SUBJECTIVE AND OBJECTIVE BOX
VASCULAR SURGERY CONSULT    Consulting surgical team: Vascular Surgery   Consulting attending: Dr. Rascon  Patient seen and examined: 10-03-19 @ 18:47    HPI:  63yo male presents to the ED complaining of swelling from his RLE that started yesterday. Pain slowly worsened, crampy, pain improved with rest of his RLE. Patient reports 2-3 weeks ago noticed a hematoma in RLE which he can't recall if it was associated with trauma or not, got evaluated by PCP with U/S which patient reports showed no clot. Patient is able to ambulate without SOB or VALENTINE. He does report getting short of breath after 2 blocks or 2 flight of stairs.     PAST MEDICAL HISTORY:  Diverticulitis  EBV infection  Chronic tiredness      PAST SURGICAL HISTORY:  S/P nasal septoplasty  Cystoscopy  Laparoscopic assisted sigmoidectomy and colovesicula fistula takedown and LAR    ALLERGIES:  Zosyn (Hives)      FAMILY HISTORY:  Mother Breast CA    SOCIAL HISTORY:  Quit smoking 8 years ago after 20pack year history    HOME MEDICATIONS:    MEDICATIONS  (STANDING):    MEDICATIONS  (PRN):      VITALS & I/Os:  Vital Signs Last 24 Hrs  T(C): 36.9 (03 Oct 2019 19:20), Max: 36.9 (03 Oct 2019 13:49)  T(F): 98.4 (03 Oct 2019 19:20), Max: 98.5 (03 Oct 2019 13:49)  HR: 78 (03 Oct 2019 19:20) (71 - 78)  BP: 128/68 (03 Oct 2019 19:20) (128/68 - 132/77)  BP(mean): --  RR: 19 (03 Oct 2019 19:20) (18 - 19)  SpO2: 98% (03 Oct 2019 19:20) (98% - 99%)  CAPILLARY BLOOD GLUCOSE          I&O's Summary      GENERAL: Alert, in no acute distress.  MENTAL STATUS: AAOx3. Appropriate affect.  HEENT: PERRLA. EOMI  RESPIRATORY: CTAB  CARDIOVASCULAR: RRR  GASTROINTESTINAL: Abdomen soft, NT, ND, -R/-G.     VASCULAR: +2 PULSES, radial, femoral, DP, PT      LABS:                        13.7   7.88  )-----------( 226      ( 03 Oct 2019 14:43 )             43.1     10-03    143  |  106  |  23.0<H>  ----------------------------<  92  4.5   |  25.0  |  1.45<H>    Ca    9.0      03 Oct 2019 14:43    TPro  6.7  /  Alb  3.9  /  TBili  0.5  /  DBili  x   /  AST  15  /  ALT  17  /  AlkPhos  96  10-03    Lactate:    PT/INR - ( 03 Oct 2019 19:53 )   PT: 12.4 sec;   INR: 1.08 ratio         PTT - ( 03 Oct 2019 19:53 )  PTT:36.5 sec        10-03 @ 17:25  1.0        IMAGING:  < from: CT Angio Chest w/ IV Cont (10.03.19 @ 18:27) >     EXAM:  CT ANGIO CHEST (W)AW IC                          PROCEDURE DATE:  10/03/2019          INTERPRETATION:  CLINICAL STATEMENT: Elevated d-dimer    TECHNIQUE: CTA of the chest was performed with IV contrast.    Approximately 88 cc of Omnipaque 350 administered and 12cc was discarded.   MIP images were provided.    COMPARISON: None.    FINDINGS:    There are multiple bilateral pulmonary emboli. These involve segmental   branches to the left upper lobe, left lower lobe, and right lower lobe.   There is subsegmental upper lobe pulmonary embolism as well. There is no   evidence of right heart strain.    There is no thoracic aortic dissection or aneurysm.    There is no axillary, mediastinal or hilar lymphadenopathy by size   criteria.    There is no pericardial effusion.  There is no pleural effusion.     The heart size is within normal limits.  The lungs are clear.    The upper abdomen is remarkable for a small rounded hypodensity in the   left lobe of the liver incompletely characterized on this examination.    The osseous structures are intact.    IMPRESSION:    Multiple bilateral pulmonary emboli  Results were discussed with physician assistant Jacquelin Matute at 6:50 PM on   10/30/2019.                MAYDA VAZQUEZ M.D.,ATTENDING RADIOLOGIST  This document has been electronically signed. Oct  3 2019  6:54PM        < from: US Duplex Venous Lower Ext Ltd, Right (10.03.19 @ 16:57) >     EXAM:  US DPLX LWR EXT VEINS LTD RT                          PROCEDURE DATE:  10/03/2019          INTERPRETATION:  CLINICAL INFORMATION: Right lower extremity swelling    COMPARISON: None available.    TECHNIQUE: Duplex sonography of the RIGHT LOWER extremity veins with   color and spectral Doppler, with and without compression.      FINDINGS:    There is deep venous thrombosis in the distal femoral and popliteal   veins, tibioperoneal trunk, and and peroneal veins; proximal peroneal   veins not seen.    The right common femoral and proximal and mid femoral veins are patent   and compressible without evidence of thrombus.    IMPRESSION:     Deep venous thrombosis in the distal right femoral vein, popliteal vein,   tibioperoneal trunk, and distal peroneal vein.    The findings were discussed with STEPH Roper at 5:12 PM on 10/3/2019.                          LESLY PARSONS   This document has been electronically signed. Oct  3 2019  5:12PM                  < end of copied text >

## 2019-10-03 NOTE — ED STATDOCS - MUSCULOSKELETAL FINDINGS, MLM
TENDERNESS/swelling in calf and posterior calf with mild tenderness. calf refill less than 2 secs. swelling in calf and posterior calf with mild tenderness. calf refill less than 2 secs.. strong DP and PT pulses/TENDERNESS

## 2019-10-03 NOTE — H&P ADULT - HISTORY OF PRESENT ILLNESS
64yoM hx chronic fatigue syndrome on Adderall PRN, presenting with RLE swelling and pain.  Pt reports swelling of this RLE that occurred about 2 weeks ago and developing a ‘hematoma’ from a possible injury to his leg, although pt unable to recall exact mechanism of injury.  Per tele-hospitalist note, pt had outpatient leg US that showed small clot and pt was started on heparin for few days; however pt denies this ever being diagnosed with DVT or being started on AC as outpatient and says he daughter was the one who told to this tele-hospitalist but it was inaccurate.   His RLE swelling had initially subsided as hematoma resolved but then earlier today, he noticed acute worsening of the swelling associated with pain that was worse with ambulation.      On admission, RLE US showed DVT in the distal right femoral vein, popliteal vein, tibioperoneal trunk, and distal peroneal vein and CTA showed multiple bilateral pulmonary emboli.  Pt denies any recent travel, periods of prolonged immobility, FHx of VTE, chest pain or dyspnea.  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  Pt seen by vascular in ED who did not recommend any surgical intervention.

## 2019-10-03 NOTE — CONSULT NOTE ADULT - SUBJECTIVE AND OBJECTIVE BOX
REASON FOR Tele-evaluation    SUBJECTIVE: pain and swelling of right lower extremity     ED HPI: 65y/o M with PMHx of chronic Fatigue (Adderall) presents to the ED sudden onset RLE edema, onset this morning, woke him from his sleep, with sx of blurry vision, worse with movement, since resolved. Pt reports pain is worse when weight beating. Pt reports that he developed a hematoma 2 weeks ago, unsure on injury or trauma. Pt had prior doppler which was normal. Denies CP, SOB, fever, prior blood clots, weakness, groin pain or numbness of LE.  · Associated Symptoms: RLE edema, blurry vision  · Significant Negative Findings: no fever, shortness of breath, chest pain, groin pain  · Location: RLE  · Radiation: none  · Context: Unknown  · Quality: ACHING  · Timing: sudden onset this morning  · Aggravating factors: nothing  · Relieving factors: nothing    Above ED HPI reviewed and noted: Pt reports that he hit his right leg approximately 3 weeks ago, with a resultant hematoma, doppler at that time showed a small clot and patient was started on heparin for 3 days, yesterday he developed severe pain and swelling which caused him to seek medical attention.        OBJECTIVE  ROS:  all other ROS negative except as documented above.     PHYSICAL EXAM: Tele-evaluation precludes physical exam.   Vital Signs Last 24 Hrs  T(C): 36.9 (03 Oct 2019 19:20), Max: 36.9 (03 Oct 2019 13:49)  T(F): 98.4 (03 Oct 2019 19:20), Max: 98.5 (03 Oct 2019 13:49)  HR: 78 (03 Oct 2019 19:20) (71 - 78)  BP: 128/68 (03 Oct 2019 19:20) (128/68 - 132/77)  BP(mean): --  RR: 19 (03 Oct 2019 19:20) (18 - 19)  SpO2: 98% (03 Oct 2019 19:20) (98% - 99%)        LABS: All Labs Reviewed:                        13.7   7.88  )-----------( 226      ( 03 Oct 2019 14:43 )             43.1     10-03    143  |  106  |  23.0<H>  ----------------------------<  92  4.5   |  25.0  |  1.45<H>    Ca    9.0      03 Oct 2019 14:43    TPro  6.7  /  Alb  3.9  /  TBili  0.5  /  DBili  x   /  AST  15  /  ALT  17  /  AlkPhos  96  10-03    PT/INR - ( 03 Oct 2019 19:53 )   PT: 12.4 sec;   INR: 1.08 ratio         PTT - ( 03 Oct 2019 19:53 )  PTT:36.5 sec          Blood Culture:     RADIOLOGY/EKG:  US duplex:   FINDINGS:    There is deep venous thrombosis in the distal femoral and popliteal   veins, tibioperoneal trunk, and and peroneal veins; proximal peroneal   veins not seen.    The right common femoral and proximal and mid femoral veins are patent   and compressible without evidence of thrombus.    IMPRESSION:     Deep venous thrombosis in the distal right femoral vein, popliteal vein,   tibioperoneal trunk, and distal peroneal vein.    CTA chest:    FINDINGS:    There are multiple bilateral pulmonary emboli. These involve segmental   branches to the left upper lobe, left lower lobe, and right lower lobe.   There is subsegmental upper lobe pulmonary embolism as well. There is no   evidence of right heart strain.    There is no thoracic aortic dissection or aneurysm.    There is no axillary, mediastinal or hilar lymphadenopathy by size   criteria.    There is no pericardial effusion.  There is no pleural effusion.     The heart size is within normal limits.  The lungs are clear.    The upper abdomen is remarkable for a small rounded hypodensity in the   left lobe of the liver incompletely characterized on this examination.    The osseous structures are intact.    IMPRESSION:    Multiple bilateral pulmonary emboli  Results were discussed with physician assistant Jacquelin Matute at 6:50 PM on   10/30/2019.      ASSESSMENT AND PLAN:   65 y/o male currently admitted with bilateral PE secondary to provoked extensive DVT  - admit to monitored unit with   - Heparin Drip, no Bolus as patient already received single full dose Lovenox.    - monitor PTT  - monitor for s/s of bleeding  - f/u ECHO  - bedrest for 24hrs  - regular diet    h/o insomnia, ambien 10mg HS  h/o chronic fatigue syndrome on chronic Adderall 10mg 2-4 times per week. - not currently resumed.      Care plan discussed with Dr. Dudley

## 2019-10-03 NOTE — ED STATDOCS - OBJECTIVE STATEMENT
63y/o M with PMHx of chronic Fatigue (Adderall) presents to the ED sudden onset RLE edema, onset this morning, woke him from his sleep, with sx of blurry vision, worse with movement, since resolved. Pt reports pain is worse when weight beating. Pt reports that he developed a hematoma 2 weeks ago, unsure on injury or trauma. Pt had prior doppler which was normal. Denies CP, SOB, fever, prior blood clots, weakness, groin pain or numbness of LE.

## 2019-10-03 NOTE — ED ADULT TRIAGE NOTE - CHIEF COMPLAINT QUOTE
Patient arrived to ED today with c/o two weeks ago developing swelling to his RLE.  Patient had doppler preformed which was negative.  patient reports swelling has increased and pain also.

## 2019-10-03 NOTE — H&P ADULT - ASSESSMENT
64yoM hx chronic fatigue syndrome, presenting with RLE swelling and pain after recent possible injury to leg found to have extensive RLE DVT and multiple bilateral PE    #Acute extensive DVT  -Possibly provoked from recent leg injury causing hematoma  -Admit to monitored bed with   -Seen by vascular in ED, no need for surgical intervention  -Received therapeutic lovenox x1 in ED, then started on heparin gtt  -Can continue with heparin gtt for 24hours then can transition to NOAC  -Can consider heme eval    #Acute PE  -As above, ?provoked from recent leg injury  -TTE and cardiac markers ordered to assess for right heart strain  -Heparin gtt as above     #CARMEN on ?CKD- elevated Cr of 1.45, last Cr in 7/2018 was 1.26..  Will give gentle IVF bolus. Trend Cr and avoid nephrotoxins.     #Chronic fatigue syndrome- Adderall PRN, verified with iSTOP.    #Insomnia- Ambien PRN, verified with iSTOP    #Prophylactic measure- Heparin gtt.

## 2019-10-03 NOTE — CONSULT NOTE ADULT - ASSESSMENT
65yo male with findings of RLE DVT and b/l pulmonary emboli.   - No acute vascular surgery intervention at this time  - Recommend STAT ABG  - Recommend Medical consult  - Anticoagulation per medical team, from Vascular surgery perspective recommend Xarelto for AC

## 2019-10-03 NOTE — ED STATDOCS - PROGRESS NOTE DETAILS
+ D dimer, will get EKG and CTA US leg + for DVT. Vascular consulted to see pt. Recommending loading dose of Lovenox. CTA reveals b/l PEs Vascular recommending admission. Will admit, get coags and ABG.

## 2019-10-03 NOTE — H&P ADULT - NSHPLABSRESULTS_GEN_ALL_CORE
13.7   7.88  )-----------( 226      ( 03 Oct 2019 14:43 )             43.1       10-03    143  |  106  |  23.0<H>  ----------------------------<  92  4.5   |  25.0  |  1.45<H>    Ca    9.0      03 Oct 2019 14:43    TPro  6.7  /  Alb  3.9  /  TBili  0.5  /  DBili  x   /  AST  15  /  ALT  17  /  AlkPhos  96  10-03 13.7   7.88  )-----------( 226      ( 03 Oct 2019 14:43 )             43.1       10-03    143  |  106  |  23.0<H>  ----------------------------<  92  4.5   |  25.0  |  1.45<H>    Ca    9.0      03 Oct 2019 14:43    TPro  6.7  /  Alb  3.9  /  TBili  0.5  /  DBili  x   /  AST  15  /  ALT  17  /  AlkPhos  96  10-03    EKG:  NSR, HR 64

## 2019-10-03 NOTE — H&P ADULT - NSHPPHYSICALEXAM_GEN_ALL_CORE
Vital Signs Last 24 Hrs  T(C): 36.8 (04 Oct 2019 00:11), Max: 36.9 (03 Oct 2019 13:49)  T(F): 98.2 (04 Oct 2019 00:11), Max: 98.5 (03 Oct 2019 13:49)  HR: 77 (04 Oct 2019 00:11) (71 - 78)  BP: 102/67 (04 Oct 2019 00:11) (102/67 - 132/77)  BP(mean): --  RR: 19 (04 Oct 2019 00:11) (18 - 19)  SpO2: 96% (04 Oct 2019 00:11) (96% - 99%)    GENERAL:  Well-appearing, not in acute distress  EYES:  Clear conjuctiva, extraocular movement intact  ENT: Moist mucous membranes  RESP:  Non-labored breathing pattern, lungs clear to ausculation in anterior fields  CV: Regular rate and rhythm, no murmurs appreciated, no lower extremity edema  GI: Soft, non-tender, non-distended  NEURO: Awake, alert, conversant, upper and lower extremity strength 5/5, light touch sensation grossly intact  PSYCH: Calm, cooperative  SKIN: No rash or lesions, warm and dry Vital Signs Last 24 Hrs  T(C): 36.8 (04 Oct 2019 00:11), Max: 36.9 (03 Oct 2019 13:49)  T(F): 98.2 (04 Oct 2019 00:11), Max: 98.5 (03 Oct 2019 13:49)  HR: 77 (04 Oct 2019 00:11) (71 - 78)  BP: 102/67 (04 Oct 2019 00:11) (102/67 - 132/77)  BP(mean): --  RR: 19 (04 Oct 2019 00:11) (18 - 19)  SpO2: 96% (04 Oct 2019 00:11) (96% - 99%)    GENERAL:  Well-appearing, not in acute distress  EYES:  Clear conjunctiva, extraocular movement intact  ENT: Moist mucous membranes  RESP:  Non-labored breathing pattern, lungs clear to ausculation   CV: Regular rate and rhythm, no murmurs appreciated, RLE in ACE bandage, once unwrapped RLE circumference > LLE circumference   GI: Soft, non-tender, non-distended, surgical scar on lower abdomen   NEURO: Awake, alert, conversant, non-focal  PSYCH: Calm, cooperative  SKIN: No rash or lesions, warm and dry

## 2019-10-04 DIAGNOSIS — I26.99 OTHER PULMONARY EMBOLISM WITHOUT ACUTE COR PULMONALE: ICD-10-CM

## 2019-10-04 DIAGNOSIS — I82.4Y1 ACUTE EMBOLISM AND THROMBOSIS OF UNSPECIFIED DEEP VEINS OF RIGHT PROXIMAL LOWER EXTREMITY: ICD-10-CM

## 2019-10-04 DIAGNOSIS — Z90.49 ACQUIRED ABSENCE OF OTHER SPECIFIED PARTS OF DIGESTIVE TRACT: Chronic | ICD-10-CM

## 2019-10-04 LAB
ANION GAP SERPL CALC-SCNC: 12 MMOL/L — SIGNIFICANT CHANGE UP (ref 5–17)
APTT BLD: 115.7 SEC — HIGH (ref 27.5–36.3)
APTT BLD: 127.6 SEC — CRITICAL HIGH (ref 27.5–36.3)
APTT BLD: 86.8 SEC — HIGH (ref 27.5–36.3)
BASOPHILS # BLD AUTO: 0.05 K/UL — SIGNIFICANT CHANGE UP (ref 0–0.2)
BASOPHILS NFR BLD AUTO: 0.7 % — SIGNIFICANT CHANGE UP (ref 0–2)
BUN SERPL-MCNC: 21 MG/DL — HIGH (ref 8–20)
CALCIUM SERPL-MCNC: 8.6 MG/DL — SIGNIFICANT CHANGE UP (ref 8.6–10.2)
CHLORIDE SERPL-SCNC: 106 MMOL/L — SIGNIFICANT CHANGE UP (ref 98–107)
CK SERPL-CCNC: 114 U/L — SIGNIFICANT CHANGE UP (ref 30–200)
CO2 SERPL-SCNC: 22 MMOL/L — SIGNIFICANT CHANGE UP (ref 22–29)
CREAT SERPL-MCNC: 1.13 MG/DL — SIGNIFICANT CHANGE UP (ref 0.5–1.3)
EOSINOPHIL # BLD AUTO: 0.41 K/UL — SIGNIFICANT CHANGE UP (ref 0–0.5)
EOSINOPHIL NFR BLD AUTO: 5.5 % — SIGNIFICANT CHANGE UP (ref 0–6)
GLUCOSE SERPL-MCNC: 128 MG/DL — HIGH (ref 70–115)
HCT VFR BLD CALC: 40.1 % — SIGNIFICANT CHANGE UP (ref 39–50)
HCT VFR BLD CALC: 40.4 % — SIGNIFICANT CHANGE UP (ref 39–50)
HCV AB S/CO SERPL IA: 0.13 S/CO — SIGNIFICANT CHANGE UP (ref 0–0.99)
HCV AB SERPL-IMP: SIGNIFICANT CHANGE UP
HGB BLD-MCNC: 13 G/DL — SIGNIFICANT CHANGE UP (ref 13–17)
HGB BLD-MCNC: 13.1 G/DL — SIGNIFICANT CHANGE UP (ref 13–17)
IMM GRANULOCYTES NFR BLD AUTO: 0.1 % — SIGNIFICANT CHANGE UP (ref 0–1.5)
LYMPHOCYTES # BLD AUTO: 2 K/UL — SIGNIFICANT CHANGE UP (ref 1–3.3)
LYMPHOCYTES # BLD AUTO: 26.6 % — SIGNIFICANT CHANGE UP (ref 13–44)
MCHC RBC-ENTMCNC: 29.5 PG — SIGNIFICANT CHANGE UP (ref 27–34)
MCHC RBC-ENTMCNC: 30 PG — SIGNIFICANT CHANGE UP (ref 27–34)
MCHC RBC-ENTMCNC: 32.2 GM/DL — SIGNIFICANT CHANGE UP (ref 32–36)
MCHC RBC-ENTMCNC: 32.7 GM/DL — SIGNIFICANT CHANGE UP (ref 32–36)
MCV RBC AUTO: 91.6 FL — SIGNIFICANT CHANGE UP (ref 80–100)
MCV RBC AUTO: 91.8 FL — SIGNIFICANT CHANGE UP (ref 80–100)
MONOCYTES # BLD AUTO: 0.74 K/UL — SIGNIFICANT CHANGE UP (ref 0–0.9)
MONOCYTES NFR BLD AUTO: 9.8 % — SIGNIFICANT CHANGE UP (ref 2–14)
NEUTROPHILS # BLD AUTO: 4.31 K/UL — SIGNIFICANT CHANGE UP (ref 1.8–7.4)
NEUTROPHILS NFR BLD AUTO: 57.3 % — SIGNIFICANT CHANGE UP (ref 43–77)
NT-PROBNP SERPL-SCNC: 142 PG/ML — SIGNIFICANT CHANGE UP (ref 0–300)
PLATELET # BLD AUTO: 207 K/UL — SIGNIFICANT CHANGE UP (ref 150–400)
PLATELET # BLD AUTO: 218 K/UL — SIGNIFICANT CHANGE UP (ref 150–400)
POTASSIUM SERPL-MCNC: 4.2 MMOL/L — SIGNIFICANT CHANGE UP (ref 3.5–5.3)
POTASSIUM SERPL-SCNC: 4.2 MMOL/L — SIGNIFICANT CHANGE UP (ref 3.5–5.3)
RBC # BLD: 4.37 M/UL — SIGNIFICANT CHANGE UP (ref 4.2–5.8)
RBC # BLD: 4.41 M/UL — SIGNIFICANT CHANGE UP (ref 4.2–5.8)
RBC # FLD: 13.2 % — SIGNIFICANT CHANGE UP (ref 10.3–14.5)
RBC # FLD: 13.2 % — SIGNIFICANT CHANGE UP (ref 10.3–14.5)
SODIUM SERPL-SCNC: 140 MMOL/L — SIGNIFICANT CHANGE UP (ref 135–145)
TROPONIN T SERPL-MCNC: <0.01 NG/ML — SIGNIFICANT CHANGE UP (ref 0–0.06)
WBC # BLD: 6.73 K/UL — SIGNIFICANT CHANGE UP (ref 3.8–10.5)
WBC # BLD: 7.52 K/UL — SIGNIFICANT CHANGE UP (ref 3.8–10.5)
WBC # FLD AUTO: 6.73 K/UL — SIGNIFICANT CHANGE UP (ref 3.8–10.5)
WBC # FLD AUTO: 7.52 K/UL — SIGNIFICANT CHANGE UP (ref 3.8–10.5)

## 2019-10-04 PROCEDURE — 99232 SBSQ HOSP IP/OBS MODERATE 35: CPT

## 2019-10-04 RX ORDER — DEXTROAMPHETAMINE SACCHARATE, AMPHETAMINE ASPARTATE, DEXTROAMPHETAMINE SULFATE AND AMPHETAMINE SULFATE 1.875; 1.875; 1.875; 1.875 MG/1; MG/1; MG/1; MG/1
10 TABLET ORAL
Refills: 0 | Status: DISCONTINUED | OUTPATIENT
Start: 2019-10-04 | End: 2019-10-06

## 2019-10-04 RX ORDER — DEXTROAMPHETAMINE SACCHARATE, AMPHETAMINE ASPARTATE, DEXTROAMPHETAMINE SULFATE AND AMPHETAMINE SULFATE 1.875; 1.875; 1.875; 1.875 MG/1; MG/1; MG/1; MG/1
1 TABLET ORAL
Qty: 0 | Refills: 0 | DISCHARGE

## 2019-10-04 RX ORDER — SODIUM CHLORIDE 9 MG/ML
500 INJECTION INTRAMUSCULAR; INTRAVENOUS; SUBCUTANEOUS ONCE
Refills: 0 | Status: COMPLETED | OUTPATIENT
Start: 2019-10-04 | End: 2019-10-04

## 2019-10-04 RX ORDER — INFLUENZA VIRUS VACCINE 15; 15; 15; 15 UG/.5ML; UG/.5ML; UG/.5ML; UG/.5ML
0.5 SUSPENSION INTRAMUSCULAR ONCE
Refills: 0 | Status: DISCONTINUED | OUTPATIENT
Start: 2019-10-04 | End: 2019-10-06

## 2019-10-04 RX ORDER — PANTOPRAZOLE SODIUM 20 MG/1
40 TABLET, DELAYED RELEASE ORAL DAILY
Refills: 0 | Status: DISCONTINUED | OUTPATIENT
Start: 2019-10-04 | End: 2019-10-06

## 2019-10-04 RX ORDER — ZOLPIDEM TARTRATE 10 MG/1
1 TABLET ORAL
Qty: 0 | Refills: 0 | DISCHARGE

## 2019-10-04 RX ORDER — HEPARIN SODIUM 5000 [USP'U]/ML
1600 INJECTION INTRAVENOUS; SUBCUTANEOUS
Qty: 25000 | Refills: 0 | Status: DISCONTINUED | OUTPATIENT
Start: 2019-10-04 | End: 2019-10-06

## 2019-10-04 RX ADMIN — ZOLPIDEM TARTRATE 5 MILLIGRAM(S): 10 TABLET ORAL at 23:43

## 2019-10-04 RX ADMIN — PANTOPRAZOLE SODIUM 40 MILLIGRAM(S): 20 TABLET, DELAYED RELEASE ORAL at 18:00

## 2019-10-04 RX ADMIN — HEPARIN SODIUM 1400 UNIT(S)/HR: 5000 INJECTION INTRAVENOUS; SUBCUTANEOUS at 23:46

## 2019-10-04 RX ADMIN — ZOLPIDEM TARTRATE 5 MILLIGRAM(S): 10 TABLET ORAL at 22:31

## 2019-10-04 RX ADMIN — HEPARIN SODIUM 1400 UNIT(S)/HR: 5000 INJECTION INTRAVENOUS; SUBCUTANEOUS at 17:12

## 2019-10-04 RX ADMIN — SODIUM CHLORIDE 500 MILLILITER(S): 9 INJECTION INTRAMUSCULAR; INTRAVENOUS; SUBCUTANEOUS at 02:36

## 2019-10-04 RX ADMIN — Medication 650 MILLIGRAM(S): at 08:52

## 2019-10-04 RX ADMIN — HEPARIN SODIUM 1600 UNIT(S)/HR: 5000 INJECTION INTRAVENOUS; SUBCUTANEOUS at 08:04

## 2019-10-04 NOTE — CONSULT NOTE ADULT - PROBLEM SELECTOR PROBLEM 1
Acute pulmonary embolism, unspecified pulmonary embolism type, unspecified whether acute cor pulmonale present

## 2019-10-04 NOTE — PROGRESS NOTE ADULT - SUBJECTIVE AND OBJECTIVE BOX
CC: DVT and PE     INTERVAL HPI/OVERNIGHT EVENTS: patient seen and examined , ECHO pending . discussed with vascular no intervention . requested for hematology consult.     REVIEW OF SYSTEMS:    CONSTITUTIONAL: No fever, weight loss, or fatigue  RESPIRATORY: exertional SOB which is improved .   CARDIOVASCULAR: No chest pain, palpitations  GASTROINTESTINAL: No abdominal or epigastric pain. No nausea, vomiting  NEUROLOGICAL: No headaches, memory loss, loss of strength.      Vital Signs Last 24 Hrs  T(C): 37.1 (04 Oct 2019 12:33), Max: 37.1 (04 Oct 2019 12:33)  T(F): 98.7 (04 Oct 2019 12:33), Max: 98.7 (04 Oct 2019 12:33)  HR: 61 (04 Oct 2019 12:33) (59 - 78)  BP: 118/73 (04 Oct 2019 12:33) (102/67 - 132/77)  BP(mean): --  RR: 18 (04 Oct 2019 12:33) (18 - 19)  SpO2: 95% (04 Oct 2019 12:33) (95% - 99%)    PHYSICAL EXAM:    GENERAL: NAD, resting comfortable in bed   HEENT: NC, AT , PERRLA , no conjunctival pallor   CHEST/LUNG: Clear to percussion bilaterally; No wheezing  HEART: S1S2+, Regular rate and rhythm; No murmurs, rubs, or gallops  ABDOMEN: Soft, Nontender, Nondistended; Bowel sounds present  EXTREMITIES:  no pitting edema on left , right lower extremity positive for swelling  , pulses palpated BL.    LABS:                        13.0   7.52  )-----------( 207      ( 04 Oct 2019 06:14 )             40.4     10-04    140  |  106  |  21.0<H>  ----------------------------<  128<H>  4.2   |  22.0  |  1.13    Ca    8.6      04 Oct 2019 06:14    TPro  6.7  /  Alb  3.9  /  TBili  0.5  /  DBili  x   /  AST  15  /  ALT  17  /  AlkPhos  96  10-03    PT/INR - ( 03 Oct 2019 19:53 )   PT: 12.4 sec;   INR: 1.08 ratio         PTT - ( 04 Oct 2019 06:15 )  PTT:127.6 sec        MEDICATIONS  (STANDING):  heparin  Infusion. 1600 Unit(s)/Hr (16 mL/Hr) IV Continuous <Continuous>    MEDICATIONS  (PRN):  amphetamine/dextroamphetamine 10 milliGRAM(s) Oral four times a day PRN Fatigue  heparin  Injectable 8500 Unit(s) IV Push every 6 hours PRN For aPTT less than 40  heparin  Injectable 4000 Unit(s) IV Push every 6 hours PRN For aPTT between 40 - 57  zolpidem 5 milliGRAM(s) Oral at bedtime PRN Insomnia  zolpidem 5 milliGRAM(s) Oral at bedtime PRN Insomnia      RADIOLOGY & ADDITIONAL TESTS:

## 2019-10-04 NOTE — CONSULT NOTE ADULT - PROBLEM SELECTOR RECOMMENDATION 2
-As above, appears to be unprovoked. Treatment of the thrombi does not change regardless of etiology.  -He has normal renal and liver function. I suggest transitioning to eliquis (or other DOAC- depending on insurance coverage/cost, etc) as soon as feasible.  -To transition, stop heparin gtt and start eliquis, 10mg PO BID x7 days, then 5mg PO BID.  -Plan is for at least 3 months, likely 6 months of AC.  -We will proceed with hypercoagulable testing as an outpatient, no further testing to be completed while hospitalized.    No hematologic contraindication to discharge once medically appropriate.

## 2019-10-04 NOTE — CONSULT NOTE ADULT - PROBLEM SELECTOR RECOMMENDATION 9
-Seemingly unprovoked, as there is no clear history of trauma.   -Awaiting TTE to evaluate for heart strain  -Currently on heparin gtt. AC management below.

## 2019-10-04 NOTE — PROGRESS NOTE ADULT - ASSESSMENT
1. Acute RLE DVT and BL PE   seems to be provoked since patient mentioned injury to leg however he is not certain .  on heparin drip , vitals stable.  will follow ECHO for right ventricular strain , CT was negative for strain ,  to be seen by hematology oncology .   vascular on board , no intervention done .  discussed about catheter directed thrombolysis with vascular team.  will follow up with hem onc regarding NOAC versus coumadin .  will at least need 3-6 month .    2. Chronic fatigue syndrome   adderal as needed .    3. Insomnia   on ambien .    4. DVT prophylaxis   on heparin drip .     details discussed with wife and patient at bedside . all concerns answered .   hem onc consultation requested .

## 2019-10-04 NOTE — CONSULT NOTE ADULT - SUBJECTIVE AND OBJECTIVE BOX
[facility] [address]                                                                                  [place] [address]                                                                                       [phone]                                                Marla Elizabeth, Wilmar Cortez, Lou Schmitz and Conor Harris        HPI:  64yoM hx chronic fatigue syndrome on Adderall PRN, presenting with RLE swelling and pain.  Pt reports swelling of this RLE that occurred about 2 weeks ago and developing a ‘hematoma’ from a possible injury to his leg, although pt unable to recall exact mechanism of injury.  Per tele-hospitalist note, pt had outpatient leg US that showed small clot and pt was started on heparin for few days; however pt denies this ever being diagnosed with DVT or being started on AC as outpatient and says his daughter was the one who told to this tele-hospitalist but it was inaccurate.   His RLE swelling had initially subsided as hematoma resolved but then earlier today, he noticed acute worsening of the swelling associated with pain that was worse with ambulation.      On admission, RLE US showed DVT in the distal right femoral vein, popliteal vein, tibioperoneal trunk, and distal peroneal vein and CTA showed multiple bilateral pulmonary emboli.  Pt denies any recent travel, periods of prolonged immobility, FHx of VTE, chest pain or dyspnea.  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 But, he states he stops frequently and moves. Pt seen by vascular in ED who did not recommend any surgical intervention.    Now on heparin gtt, swelling in leg is improving. No pleuritic pain.    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    He also sees a urologist and assumes his PSA is normal, as it had been high at the time of his colonic fistula, felt to be due to the local inflammation.    Allergies    Zosyn (Hives)    Intolerances        MEDICATIONS  (STANDING):  heparin  Infusion. 1600 Unit(s)/Hr (16 mL/Hr) IV Continuous <Continuous>    MEDICATIONS  (PRN):  amphetamine/dextroamphetamine 10 milliGRAM(s) Oral four times a day PRN Fatigue  heparin  Injectable 8500 Unit(s) IV Push every 6 hours PRN For aPTT less than 40  heparin  Injectable 4000 Unit(s) IV Push every 6 hours PRN For aPTT between 40 - 57  zolpidem 5 milliGRAM(s) Oral at bedtime PRN Insomnia  zolpidem 5 milliGRAM(s) Oral at bedtime PRN Insomnia      PAST MEDICAL & SURGICAL HISTORY:  Diverticulitis  S/P colon resection: (7/2018)  S/P nasal septoplasty      FAMILY HISTORY:  Family history of breast cancer      SOCIAL HISTORY: No EtOH, past tobacco    REVIEW OF SYSTEMS:    CONSTITUTIONAL: No weakness, fevers or chills  EYES/ENT: No visual changes;  No vertigo or throat pain   NECK: No pain or stiffness  RESPIRATORY: No cough, wheezing, hemoptysis; No shortness of breath  CARDIOVASCULAR: No chest pain or palpitations  GASTROINTESTINAL: No abdominal or epigastric pain. No nausea, vomiting, or hematemesis; No diarrhea or constipation. No melena or hematochezia.  GENITOURINARY: No dysuria, frequency or hematuria  NEUROLOGICAL: No numbness or weakness  SKIN: No itching, burning, rashes, or lesions   MSK: RLE swelling and pain  All other review of systems is negative unless indicated above.      Weight (kg): 102.1 (10-03 @ 21:47)    T(F): 98.7 (10-04-19 @ 12:33), Max: 98.7 (10-04-19 @ 12:33)  HR: 61 (10-04-19 @ 12:33)  BP: 118/73 (10-04-19 @ 12:33)  RR: 18 (10-04-19 @ 12:33)  SpO2: 95% (10-04-19 @ 12:33)  Wt(kg): --    GENERAL: NAD, well-developed  HEAD:  Atraumatic, Normocephalic  EYES: EOMI, PERRLA, conjunctiva and sclera clear  NECK: Supple, No JVD  CHEST/LUNG: Clear to auscultation bilaterally; No wheeze  HEART: Regular rate and rhythm; No murmurs, rubs, or gallops  ABDOMEN: Soft, Nontender, Nondistended; Bowel sounds present  EXTREMITIES:  RLE >LLE, ACE bandage in place  NEUROLOGY: non-focal  SKIN: No rashes or lesions                          13.0   7.52  )-----------( 207      ( 04 Oct 2019 06:14 )             40.4       10-04    140  |  106  |  21.0<H>  ----------------------------<  128<H>  4.2   |  22.0  |  1.13    Ca    8.6      04 Oct 2019 06:14    TPro  6.7  /  Alb  3.9  /  TBili  0.5  /  DBili  x   /  AST  15  /  ALT  17  /  AlkPhos  96  10-03          < from: CT Angio Chest w/ IV Cont (10.03.19 @ 18:27) >    IMPRESSION:    Multiple bilateral pulmonary emboli      < from: US Duplex Venous Lower Ext Ltd, Right (10.03.19 @ 16:57) >    IMPRESSION:     Deep venous thrombosis in the distal right femoral vein, popliteal vein,   tibioperoneal trunk, and distal peroneal vein.    < end of copied text >

## 2019-10-05 LAB
ANION GAP SERPL CALC-SCNC: 10 MMOL/L — SIGNIFICANT CHANGE UP (ref 5–17)
APTT BLD: 102.2 SEC — HIGH (ref 27.5–36.3)
APTT BLD: 67 SEC — HIGH (ref 27.5–36.3)
APTT BLD: 77.4 SEC — HIGH (ref 27.5–36.3)
BUN SERPL-MCNC: 20 MG/DL — SIGNIFICANT CHANGE UP (ref 8–20)
CALCIUM SERPL-MCNC: 8.8 MG/DL — SIGNIFICANT CHANGE UP (ref 8.6–10.2)
CHLORIDE SERPL-SCNC: 107 MMOL/L — SIGNIFICANT CHANGE UP (ref 98–107)
CO2 SERPL-SCNC: 23 MMOL/L — SIGNIFICANT CHANGE UP (ref 22–29)
CREAT SERPL-MCNC: 1.18 MG/DL — SIGNIFICANT CHANGE UP (ref 0.5–1.3)
GLUCOSE SERPL-MCNC: 127 MG/DL — HIGH (ref 70–115)
HCT VFR BLD CALC: 41.4 % — SIGNIFICANT CHANGE UP (ref 39–50)
HGB BLD-MCNC: 13.5 G/DL — SIGNIFICANT CHANGE UP (ref 13–17)
MCHC RBC-ENTMCNC: 29.4 PG — SIGNIFICANT CHANGE UP (ref 27–34)
MCHC RBC-ENTMCNC: 32.6 GM/DL — SIGNIFICANT CHANGE UP (ref 32–36)
MCV RBC AUTO: 90.2 FL — SIGNIFICANT CHANGE UP (ref 80–100)
PLATELET # BLD AUTO: 222 K/UL — SIGNIFICANT CHANGE UP (ref 150–400)
POTASSIUM SERPL-MCNC: 4 MMOL/L — SIGNIFICANT CHANGE UP (ref 3.5–5.3)
POTASSIUM SERPL-SCNC: 4 MMOL/L — SIGNIFICANT CHANGE UP (ref 3.5–5.3)
RBC # BLD: 4.59 M/UL — SIGNIFICANT CHANGE UP (ref 4.2–5.8)
RBC # FLD: 12.9 % — SIGNIFICANT CHANGE UP (ref 10.3–14.5)
SODIUM SERPL-SCNC: 140 MMOL/L — SIGNIFICANT CHANGE UP (ref 135–145)
WBC # BLD: 7.35 K/UL — SIGNIFICANT CHANGE UP (ref 3.8–10.5)
WBC # FLD AUTO: 7.35 K/UL — SIGNIFICANT CHANGE UP (ref 3.8–10.5)

## 2019-10-05 PROCEDURE — 99232 SBSQ HOSP IP/OBS MODERATE 35: CPT

## 2019-10-05 RX ORDER — APIXABAN 2.5 MG/1
2 TABLET, FILM COATED ORAL
Qty: 28 | Refills: 0
Start: 2019-10-05 | End: 2019-10-11

## 2019-10-05 RX ADMIN — ZOLPIDEM TARTRATE 5 MILLIGRAM(S): 10 TABLET ORAL at 21:31

## 2019-10-05 RX ADMIN — HEPARIN SODIUM 1200 UNIT(S)/HR: 5000 INJECTION INTRAVENOUS; SUBCUTANEOUS at 22:50

## 2019-10-05 RX ADMIN — HEPARIN SODIUM 1200 UNIT(S)/HR: 5000 INJECTION INTRAVENOUS; SUBCUTANEOUS at 15:45

## 2019-10-05 RX ADMIN — ZOLPIDEM TARTRATE 5 MILLIGRAM(S): 10 TABLET ORAL at 22:48

## 2019-10-05 RX ADMIN — PANTOPRAZOLE SODIUM 40 MILLIGRAM(S): 20 TABLET, DELAYED RELEASE ORAL at 11:16

## 2019-10-05 RX ADMIN — HEPARIN SODIUM 1200 UNIT(S)/HR: 5000 INJECTION INTRAVENOUS; SUBCUTANEOUS at 08:11

## 2019-10-05 NOTE — PROGRESS NOTE ADULT - ASSESSMENT
1. Acute RLE DVT and BL PE   unclear if provoked or unprovoked.  on heparin drip .  seen by hematology , will switch to eliquis at time of discharge.  ECHO still pending to evaluate for strain .   PT to evaluate patient as well.       2. Chronic fatigue syndrome   adderal as needed .    3. Insomnia   on ambien .    4. DVT prophylaxis   on heparin drip .     script sent to pharmacy , eliquis is covered .

## 2019-10-05 NOTE — PROGRESS NOTE ADULT - SUBJECTIVE AND OBJECTIVE BOX
CC: acute DVT and PE     INTERVAL HPI/OVERNIGHT EVENTS: patient seen and examined , felt winded and tired , waiting for ECHO . awaiting PT eval.     REVIEW OF SYSTEMS:    CONSTITUTIONAL: feels tired , leg pain is improved   RESPIRATORY: No cough, wheezing, hemoptysis; No shortness of breath  CARDIOVASCULAR: No chest pain, palpitations  GASTROINTESTINAL: No abdominal or epigastric pain. No nausea, vomiting  NEUROLOGICAL: No headaches, memory loss, loss of strength.      Vital Signs Last 24 Hrs  T(C): 36.9 (05 Oct 2019 08:42), Max: 36.9 (05 Oct 2019 08:42)  T(F): 98.4 (05 Oct 2019 08:42), Max: 98.4 (05 Oct 2019 08:42)  HR: 66 (05 Oct 2019 08:42) (61 - 66)  BP: 121/66 (05 Oct 2019 08:42) (121/66 - 138/89)  BP(mean): --  RR: 18 (05 Oct 2019 08:42) (18 - 18)  SpO2: 92% (05 Oct 2019 16:00) (92% - 96%)    PHYSICAL EXAM:    GENERAL: NAD, resting comfortable in bed   HEENT: NC, AT , PERRLA , no conjunctival pallor   CHEST/LUNG: Clear to percussion bilaterally; No wheezing  HEART: S1S2+, Regular rate and rhythm; No murmurs, rubs, or gallops  ABDOMEN: Soft, Nontender, Nondistended; Bowel sounds present  EXTREMITIES:  no pitting edema , pulses palpated BL. RLE swelling has improved .      LABS:                        13.5   7.35  )-----------( 222      ( 05 Oct 2019 07:26 )             41.4     10-05    140  |  107  |  20.0  ----------------------------<  127<H>  4.0   |  23.0  |  1.18    Ca    8.8      05 Oct 2019 07:23      PT/INR - ( 03 Oct 2019 19:53 )   PT: 12.4 sec;   INR: 1.08 ratio         PTT - ( 05 Oct 2019 15:04 )  PTT:67.0 sec        MEDICATIONS  (STANDING):  heparin  Infusion. 1600 Unit(s)/Hr (16 mL/Hr) IV Continuous <Continuous>  influenza   Vaccine 0.5 milliLiter(s) IntraMuscular once  pantoprazole  Injectable 40 milliGRAM(s) IV Push daily    MEDICATIONS  (PRN):  amphetamine/dextroamphetamine 10 milliGRAM(s) Oral four times a day PRN Fatigue  heparin  Injectable 8500 Unit(s) IV Push every 6 hours PRN For aPTT less than 40  heparin  Injectable 4000 Unit(s) IV Push every 6 hours PRN For aPTT between 40 - 57  zolpidem 5 milliGRAM(s) Oral at bedtime PRN Insomnia  zolpidem 5 milliGRAM(s) Oral at bedtime PRN Insomnia      RADIOLOGY & ADDITIONAL TESTS:

## 2019-10-06 ENCOUNTER — TRANSCRIPTION ENCOUNTER (OUTPATIENT)
Age: 64
End: 2019-10-06

## 2019-10-06 VITALS
SYSTOLIC BLOOD PRESSURE: 117 MMHG | DIASTOLIC BLOOD PRESSURE: 75 MMHG | HEART RATE: 61 BPM | OXYGEN SATURATION: 95 % | RESPIRATION RATE: 18 BRPM | TEMPERATURE: 99 F

## 2019-10-06 LAB
APTT BLD: 77.5 SEC — HIGH (ref 27.5–36.3)
HCT VFR BLD CALC: 42.7 % — SIGNIFICANT CHANGE UP (ref 39–50)
HGB BLD-MCNC: 13.6 G/DL — SIGNIFICANT CHANGE UP (ref 13–17)
MCHC RBC-ENTMCNC: 28.9 PG — SIGNIFICANT CHANGE UP (ref 27–34)
MCHC RBC-ENTMCNC: 31.9 GM/DL — LOW (ref 32–36)
MCV RBC AUTO: 90.9 FL — SIGNIFICANT CHANGE UP (ref 80–100)
PLATELET # BLD AUTO: 251 K/UL — SIGNIFICANT CHANGE UP (ref 150–400)
RBC # BLD: 4.7 M/UL — SIGNIFICANT CHANGE UP (ref 4.2–5.8)
RBC # FLD: 12.8 % — SIGNIFICANT CHANGE UP (ref 10.3–14.5)
WBC # BLD: 7.51 K/UL — SIGNIFICANT CHANGE UP (ref 3.8–10.5)
WBC # FLD AUTO: 7.51 K/UL — SIGNIFICANT CHANGE UP (ref 3.8–10.5)

## 2019-10-06 PROCEDURE — 99239 HOSP IP/OBS DSCHRG MGMT >30: CPT

## 2019-10-06 RX ORDER — APIXABAN 2.5 MG/1
1 TABLET, FILM COATED ORAL
Qty: 60 | Refills: 0
Start: 2019-10-06 | End: 2019-11-04

## 2019-10-06 RX ORDER — APIXABAN 2.5 MG/1
1 TABLET, FILM COATED ORAL
Qty: 100 | Refills: 0
Start: 2019-10-06

## 2019-10-06 RX ORDER — APIXABAN 2.5 MG/1
2 TABLET, FILM COATED ORAL
Qty: 120 | Refills: 0
Start: 2019-10-06

## 2019-10-06 RX ADMIN — HEPARIN SODIUM 1200 UNIT(S)/HR: 5000 INJECTION INTRAVENOUS; SUBCUTANEOUS at 09:45

## 2019-10-06 RX ADMIN — PANTOPRAZOLE SODIUM 40 MILLIGRAM(S): 20 TABLET, DELAYED RELEASE ORAL at 12:36

## 2019-10-06 NOTE — DISCHARGE NOTE PROVIDER - HOSPITAL COURSE
Hospital course :                                                Physical Exam :    Vitals :    GEN :     HEART:    LUNGS:    ABDOMEN:    EXT:                35 minutes were spent on discharge co ordination . Hospital course :        64yoM hx chronic fatigue syndrome on Adderall PRN, presenting with RLE swelling and pain. he was diagnosed to have extensive DVT involving right lower extremity and BL PE . he was seen by vascular surgery recommended elevation , ace wraps and AC . he was also seen by hem onc recommended to switch him to eliquis which is covered by his insurance .    ECHO was done was found to be normal no right heart strain . he will follow up with oncology and have hypercoagulable work up done . eliquis will be 10 mg BID for 7 days followed by 5mg BID . discussed at length about side effects of blood thinners with wife and patient , they understand the details. vitals stable, did not require any oxygen , also he has been ambulating in hallways without any issues . he is stable for discharge . discussed with him to avoid exertional activity for 1-2 weeks.             Physical Exam :    Vitals : stable , reviewed     GEN : age appropriate , resting in bed     HEART: S1,S2 RRR     LUNGS: CTAB     ABDOMEN: soft , NT , ND , positive bowel sounds     EXT: right leg swelling improved                 35 minutes were spent on discharge co ordination .

## 2019-10-06 NOTE — DISCHARGE NOTE PROVIDER - CARE PROVIDER_API CALL
PCP,   Phone: (   )    -  Fax: (   )    -  Follow Up Time: 1 week    Conor Harris)  Internal Medicine  [address] [facility], [place] [address]  Phone: [phone]  Fax: [phone]  Follow Up Time: 1 week

## 2019-10-06 NOTE — DISCHARGE NOTE NURSING/CASE MANAGEMENT/SOCIAL WORK - PATIENT PORTAL LINK FT
You can access the FollowMyHealth Patient Portal offered by [facility] by registering at the following website: [email or web address]. By joining [employer]’s FollowMyHealth portal, you will also be able to view your health information using other applications (apps) compatible with our system.

## 2019-10-06 NOTE — DISCHARGE NOTE NURSING/CASE MANAGEMENT/SOCIAL WORK - NSDCPEWEB_GEN_ALL_CORE
[place] website --- [email or web address] website --- [email or web address]

## 2019-10-06 NOTE — DISCHARGE NOTE PROVIDER - NSDCCPCAREPLAN_GEN_ALL_CORE_FT
PRINCIPAL DISCHARGE DIAGNOSIS  Diagnosis: DVT (deep venous thrombosis)  Assessment and Plan of Treatment:       SECONDARY DISCHARGE DIAGNOSES  Diagnosis: Pulmonary embolism  Assessment and Plan of Treatment:

## 2019-10-06 NOTE — DISCHARGE NOTE NURSING/CASE MANAGEMENT/SOCIAL WORK - NSDCPEEMAIL_GEN_ALL_CORE
[facility] for Tobacco Control email [email or web address]

## 2019-10-06 NOTE — DISCHARGE NOTE PROVIDER - PROVIDER TOKENS
FREE:[LAST:[PCP],PHONE:[(   )    -],FAX:[(   )    -],FOLLOWUP:[1 week]],PROVIDER:[TOKEN:[[id number]],FOLLOWUP:[1 week]]

## 2019-10-10 ENCOUNTER — EMERGENCY (EMERGENCY)
Facility: HOSPITAL | Age: 64
LOS: 1 days | Discharge: HOME | End: 2019-10-10
Attending: STUDENT IN AN ORGANIZED HEALTH CARE EDUCATION/TRAINING PROGRAM
Payer: MEDICAID

## 2019-10-10 VITALS
RESPIRATION RATE: 18 BRPM | DIASTOLIC BLOOD PRESSURE: 82 MMHG | HEART RATE: 82 BPM | SYSTOLIC BLOOD PRESSURE: 124 MMHG | TEMPERATURE: 98 F | WEIGHT: 229.94 LBS | HEIGHT: 69 IN | OXYGEN SATURATION: 98 %

## 2019-10-10 DIAGNOSIS — Z98.890 OTHER SPECIFIED POSTPROCEDURAL STATES: Chronic | ICD-10-CM

## 2019-10-10 DIAGNOSIS — Z90.49 ACQUIRED ABSENCE OF OTHER SPECIFIED PARTS OF DIGESTIVE TRACT: Chronic | ICD-10-CM

## 2019-10-10 PROCEDURE — 99284 EMERGENCY DEPT VISIT MOD MDM: CPT

## 2019-10-10 NOTE — ED ADULT TRIAGE NOTE - CHIEF COMPLAINT QUOTE
patient states that he was DC on Sunday with DVT to right LE, was told to come back if symptoms start to reoccur, 15 minutes PTA noticed that he has discoloration to right LE and feels a bump with swelling patient is on eliquis

## 2019-10-11 LAB
ALBUMIN SERPL ELPH-MCNC: 3.8 G/DL — SIGNIFICANT CHANGE UP (ref 3.3–5.2)
ALP SERPL-CCNC: 88 U/L — SIGNIFICANT CHANGE UP (ref 40–120)
ALT FLD-CCNC: 27 U/L — SIGNIFICANT CHANGE UP
ANION GAP SERPL CALC-SCNC: 16 MMOL/L — SIGNIFICANT CHANGE UP (ref 5–17)
AST SERPL-CCNC: 15 U/L — SIGNIFICANT CHANGE UP
BASOPHILS # BLD AUTO: 0.05 K/UL — SIGNIFICANT CHANGE UP (ref 0–0.2)
BASOPHILS NFR BLD AUTO: 0.6 % — SIGNIFICANT CHANGE UP (ref 0–2)
BILIRUB SERPL-MCNC: 0.2 MG/DL — LOW (ref 0.4–2)
BUN SERPL-MCNC: 30 MG/DL — HIGH (ref 8–20)
CALCIUM SERPL-MCNC: 8.9 MG/DL — SIGNIFICANT CHANGE UP (ref 8.6–10.2)
CHLORIDE SERPL-SCNC: 104 MMOL/L — SIGNIFICANT CHANGE UP (ref 98–107)
CO2 SERPL-SCNC: 21 MMOL/L — LOW (ref 22–29)
CREAT SERPL-MCNC: 1.38 MG/DL — HIGH (ref 0.5–1.3)
EOSINOPHIL # BLD AUTO: 0.36 K/UL — SIGNIFICANT CHANGE UP (ref 0–0.5)
EOSINOPHIL NFR BLD AUTO: 4 % — SIGNIFICANT CHANGE UP (ref 0–6)
GLUCOSE SERPL-MCNC: 125 MG/DL — HIGH (ref 70–115)
HCT VFR BLD CALC: 41.8 % — SIGNIFICANT CHANGE UP (ref 39–50)
HGB BLD-MCNC: 13.4 G/DL — SIGNIFICANT CHANGE UP (ref 13–17)
IMM GRANULOCYTES NFR BLD AUTO: 0.4 % — SIGNIFICANT CHANGE UP (ref 0–1.5)
LYMPHOCYTES # BLD AUTO: 1.87 K/UL — SIGNIFICANT CHANGE UP (ref 1–3.3)
LYMPHOCYTES # BLD AUTO: 21 % — SIGNIFICANT CHANGE UP (ref 13–44)
MCHC RBC-ENTMCNC: 29.7 PG — SIGNIFICANT CHANGE UP (ref 27–34)
MCHC RBC-ENTMCNC: 32.1 GM/DL — SIGNIFICANT CHANGE UP (ref 32–36)
MCV RBC AUTO: 92.7 FL — SIGNIFICANT CHANGE UP (ref 80–100)
MONOCYTES # BLD AUTO: 0.89 K/UL — SIGNIFICANT CHANGE UP (ref 0–0.9)
MONOCYTES NFR BLD AUTO: 10 % — SIGNIFICANT CHANGE UP (ref 2–14)
NEUTROPHILS # BLD AUTO: 5.69 K/UL — SIGNIFICANT CHANGE UP (ref 1.8–7.4)
NEUTROPHILS NFR BLD AUTO: 64 % — SIGNIFICANT CHANGE UP (ref 43–77)
PLATELET # BLD AUTO: 296 K/UL — SIGNIFICANT CHANGE UP (ref 150–400)
POTASSIUM SERPL-MCNC: 4.1 MMOL/L — SIGNIFICANT CHANGE UP (ref 3.5–5.3)
POTASSIUM SERPL-SCNC: 4.1 MMOL/L — SIGNIFICANT CHANGE UP (ref 3.5–5.3)
PROT SERPL-MCNC: 6.6 G/DL — SIGNIFICANT CHANGE UP (ref 6.6–8.7)
RBC # BLD: 4.51 M/UL — SIGNIFICANT CHANGE UP (ref 4.2–5.8)
RBC # FLD: 13.2 % — SIGNIFICANT CHANGE UP (ref 10.3–14.5)
SODIUM SERPL-SCNC: 141 MMOL/L — SIGNIFICANT CHANGE UP (ref 135–145)
WBC # BLD: 8.9 K/UL — SIGNIFICANT CHANGE UP (ref 3.8–10.5)
WBC # FLD AUTO: 8.9 K/UL — SIGNIFICANT CHANGE UP (ref 3.8–10.5)

## 2019-10-11 PROCEDURE — 85027 COMPLETE CBC AUTOMATED: CPT

## 2019-10-11 PROCEDURE — 36415 COLL VENOUS BLD VENIPUNCTURE: CPT

## 2019-10-11 PROCEDURE — 80053 COMPREHEN METABOLIC PANEL: CPT

## 2019-10-11 PROCEDURE — 99283 EMERGENCY DEPT VISIT LOW MDM: CPT

## 2019-10-11 NOTE — ED PROVIDER NOTE - ATTENDING CONTRIBUTION TO CARE
63 yo with continued leg swelling after recent diagnosis of DVT. I personally saw the patient with the PA, and completed the key components of the history and physical exam. I then discussed the management plan with the PA.

## 2019-10-11 NOTE — ED PROVIDER NOTE - PHYSICAL EXAMINATION
General-alert and oriented to person place and time, nontoxic appearing, pleasant cooperative, NAD  HEENT-normocephalic, atraumatic, NT to palp, EOMI, PERRLA, no conjunctival injections, nares patent, pinna nt to palp, tympanic membrane intact bilaterally, nonbulging TM, no erythema noted, +light reflex, moist oral mucosa, tongue nonenlarged, uvula midline, tonsils nonenlarged, no exudates or erythema noted  Neck- supple, trach midline, No JVD, no LAD  Chest- Nt to palp, no reproducible pain  Cardio-s1,s2 present, regular rate and rhythm  Resp- talks in full sentences, symmetrical chest rise, CTA bilat, no evidence of wheezes, rhonchi noted  Abdomen- bowel sounds present in all 4 quadrants, soft, NT/ND, no guarding, no rebound tenderness  MSK- moves all extremities, able to ambulate without issues, tender to palp medial to mid tibia with vascular congestion. nonpitting edema  Back- nt to palp of cervical, thoracic, lumbar spine, nt to palp of paraspinal m., No CVA tenderness  Neuro- no focal deficits, sensation intact   Pulses- 2+ DP/PT <2 sec cap refill

## 2019-10-11 NOTE — ED PROVIDER NOTE - OBJECTIVE STATEMENT
63y/o M with PMHx of chronic Fatigue (Adderall) , recently diagnosed with extensive R DVT and multiple PE's 1 week ago discharged 5 days prior presents to the ED c/o right lower leg swelling and pinching that began tonight. Pt notes he was d/c home on eliquis, pt has been compliant with his medications. Notes he felt pain described as pinching on the right leg to the anterior aspect of the right leg. Pt did not take any medication for the pain. States he noticed bluish color to the leg. Notes he has been more active then recently. States he is following his hematologist Dr. PEREZ, last saw yesterday. Denies chest pain, shortness of breath, fevers, chills, numbness, tingling, coldness to the distal extremity.

## 2019-10-11 NOTE — ED PROVIDER NOTE - PRINCIPAL DIAGNOSIS
How Severe Is Your Rash?: moderate Is This A New Presentation, Or A Follow-Up?: Rash Additional History: The patient has had perioral dermatitis for a few years. She uses a steroid nasal spray and wonders if it contrivpbutes to the rash. Wondering if there are other treatments besides the antibiotics with flares. DVT (deep venous thrombosis)

## 2019-10-11 NOTE — ED PROVIDER NOTE - PATIENT PORTAL LINK FT
You can access the FollowMyHealth Patient Portal offered by [facility] by registering at the following website: [email or web address]. By joining [employer]’s FollowMyHealth portal, you will also be able to view your health information using other applications (apps) compatible with our system.

## 2019-10-11 NOTE — ED PROVIDER NOTE - CLINICAL SUMMARY MEDICAL DECISION MAKING FREE TEXT BOX
63y/o M with PMHx of chronic Fatigue (Adderall) , recently diagnosed with extensive R DVT and multiple PE's 1 week ago discharged 5 days prior presents to the ED c/o right lower leg swelling and pinching that began tonight. pt complaint with meds, will obtain basic labs to check for infection, f/u with vascular surgeon

## 2019-10-11 NOTE — ED PROVIDER NOTE - PROGRESS NOTE DETAILS
PA NOTE: no evidence of infection, will watch and wait, Pt reassessed, pt feeling better at this time, pain has improved, vss, pt able to walk, talk and vocalized plan of action. Discussed in depth the results and findings that were performed in the ED and explained to pt in depth the next steps that need to be taking including any medications and proper follow up with PCP or specialists. Pt verbalized their concerns and all questions were answered. Pt understands dispo and agrees with discharge.

## 2019-10-11 NOTE — ED PROVIDER NOTE - CHPI ED SYMPTOMS NEG
no chills/no purulent drainage/no redness/no red streaks/no vomiting/chest pain, shortness of breath/no fever

## 2019-10-12 RX ORDER — APIXABAN 2.5 MG/1
1 TABLET, FILM COATED ORAL
Qty: 60 | Refills: 0
Start: 2019-10-12

## 2019-10-31 PROCEDURE — 84295 ASSAY OF SERUM SODIUM: CPT

## 2019-10-31 PROCEDURE — 80053 COMPREHEN METABOLIC PANEL: CPT

## 2019-10-31 PROCEDURE — 82550 ASSAY OF CK (CPK): CPT

## 2019-10-31 PROCEDURE — 82330 ASSAY OF CALCIUM: CPT

## 2019-10-31 PROCEDURE — 80048 BASIC METABOLIC PNL TOTAL CA: CPT

## 2019-10-31 PROCEDURE — 93971 EXTREMITY STUDY: CPT

## 2019-10-31 PROCEDURE — 83605 ASSAY OF LACTIC ACID: CPT

## 2019-10-31 PROCEDURE — 85379 FIBRIN DEGRADATION QUANT: CPT

## 2019-10-31 PROCEDURE — 84132 ASSAY OF SERUM POTASSIUM: CPT

## 2019-10-31 PROCEDURE — 96372 THER/PROPH/DIAG INJ SC/IM: CPT

## 2019-10-31 PROCEDURE — 85014 HEMATOCRIT: CPT

## 2019-10-31 PROCEDURE — 85730 THROMBOPLASTIN TIME PARTIAL: CPT

## 2019-10-31 PROCEDURE — 71275 CT ANGIOGRAPHY CHEST: CPT

## 2019-10-31 PROCEDURE — 86803 HEPATITIS C AB TEST: CPT

## 2019-10-31 PROCEDURE — 36600 WITHDRAWAL OF ARTERIAL BLOOD: CPT

## 2019-10-31 PROCEDURE — 93306 TTE W/DOPPLER COMPLETE: CPT

## 2019-10-31 PROCEDURE — 82803 BLOOD GASES ANY COMBINATION: CPT

## 2019-10-31 PROCEDURE — 82435 ASSAY OF BLOOD CHLORIDE: CPT

## 2019-10-31 PROCEDURE — 82947 ASSAY GLUCOSE BLOOD QUANT: CPT

## 2019-10-31 PROCEDURE — 36415 COLL VENOUS BLD VENIPUNCTURE: CPT

## 2019-10-31 PROCEDURE — 84484 ASSAY OF TROPONIN QUANT: CPT

## 2019-10-31 PROCEDURE — 85610 PROTHROMBIN TIME: CPT

## 2019-10-31 PROCEDURE — 85027 COMPLETE CBC AUTOMATED: CPT

## 2019-10-31 PROCEDURE — 83880 ASSAY OF NATRIURETIC PEPTIDE: CPT

## 2019-10-31 PROCEDURE — 93005 ELECTROCARDIOGRAM TRACING: CPT

## 2019-10-31 PROCEDURE — 99285 EMERGENCY DEPT VISIT HI MDM: CPT | Mod: 25

## 2019-12-06 ENCOUNTER — EMERGENCY (EMERGENCY)
Facility: HOSPITAL | Age: 64
LOS: 1 days | Discharge: HOME | End: 2019-12-06
Attending: EMERGENCY MEDICINE
Payer: MEDICAID

## 2019-12-06 VITALS
HEIGHT: 69 IN | SYSTOLIC BLOOD PRESSURE: 121 MMHG | DIASTOLIC BLOOD PRESSURE: 72 MMHG | WEIGHT: 225.97 LBS | HEART RATE: 60 BPM | RESPIRATION RATE: 18 BRPM | OXYGEN SATURATION: 98 % | TEMPERATURE: 98 F

## 2019-12-06 DIAGNOSIS — Z90.49 ACQUIRED ABSENCE OF OTHER SPECIFIED PARTS OF DIGESTIVE TRACT: Chronic | ICD-10-CM

## 2019-12-06 DIAGNOSIS — Z98.890 OTHER SPECIFIED POSTPROCEDURAL STATES: Chronic | ICD-10-CM

## 2019-12-06 LAB
APTT BLD: 33.5 SEC — SIGNIFICANT CHANGE UP (ref 27.5–36.3)
HCT VFR BLD CALC: 44.7 % — SIGNIFICANT CHANGE UP (ref 39–50)
HGB BLD-MCNC: 14.1 G/DL — SIGNIFICANT CHANGE UP (ref 13–17)
INR BLD: 1.12 RATIO — SIGNIFICANT CHANGE UP (ref 0.88–1.16)
MCHC RBC-ENTMCNC: 29.4 PG — SIGNIFICANT CHANGE UP (ref 27–34)
MCHC RBC-ENTMCNC: 31.5 GM/DL — LOW (ref 32–36)
MCV RBC AUTO: 93.1 FL — SIGNIFICANT CHANGE UP (ref 80–100)
PLATELET # BLD AUTO: 272 K/UL — SIGNIFICANT CHANGE UP (ref 150–400)
PROTHROM AB SERPL-ACNC: 12.9 SEC — SIGNIFICANT CHANGE UP (ref 10–12.9)
RBC # BLD: 4.8 M/UL — SIGNIFICANT CHANGE UP (ref 4.2–5.8)
RBC # FLD: 13.1 % — SIGNIFICANT CHANGE UP (ref 10.3–14.5)
WBC # BLD: 5.26 K/UL — SIGNIFICANT CHANGE UP (ref 3.8–10.5)
WBC # FLD AUTO: 5.26 K/UL — SIGNIFICANT CHANGE UP (ref 3.8–10.5)

## 2019-12-06 PROCEDURE — 99284 EMERGENCY DEPT VISIT MOD MDM: CPT

## 2019-12-06 PROCEDURE — 85610 PROTHROMBIN TIME: CPT

## 2019-12-06 PROCEDURE — 85027 COMPLETE CBC AUTOMATED: CPT

## 2019-12-06 PROCEDURE — 93971 EXTREMITY STUDY: CPT | Mod: 26,RT

## 2019-12-06 PROCEDURE — 36415 COLL VENOUS BLD VENIPUNCTURE: CPT

## 2019-12-06 PROCEDURE — 93971 EXTREMITY STUDY: CPT

## 2019-12-06 PROCEDURE — 85730 THROMBOPLASTIN TIME PARTIAL: CPT

## 2019-12-06 NOTE — ED ADULT NURSE REASSESSMENT NOTE - NS ED NURSE REASSESS COMMENT FT1
pt to be discharged to follow up with MD, pt states he feels like he's wasting our time ,explained to always come and be evaluated  no problem,  support given

## 2019-12-06 NOTE — ED ADULT TRIAGE NOTE - NS ED TRIAGE AVPU SCALE
Alert-The patient is alert, awake and responds to voice. The patient is oriented to time, place, and person. The triage nurse is able to obtain subjective information.
transfer training/bed mobility training/gait training

## 2019-12-06 NOTE — ED STATDOCS - MUSCULOSKELETAL FINDINGS, MLM
TENDERNESS/r calf circumference not measured but is larger than l calf/motor intact/normal range of motion

## 2019-12-06 NOTE — ED STATDOCS - PATIENT PORTAL LINK FT
You can access the FollowMyHealth Patient Portal offered by [facility] by registering at the following website: [email or web address]. By joining [employer]’s FollowMyHealth portal, you will also be able to view your health information using other applications (apps) compatible with our system.

## 2019-12-06 NOTE — ED STATDOCS - CHPI ED SYMPTOM NEG
no dizziness/no fever/no nausea/no back pain/no diaphoresis/no syncope/no palpitations/no cough/no vomiting/no chills

## 2019-12-06 NOTE — ED STATDOCS - ATTENDING CONTRIBUTION TO CARE
I, Mariella Patel, performed the initial face to face bedside interview with this patient regarding history of present illness, review of symptoms and relevant past medical, social and family history.  I completed an independent physical examination.  I was the initial provider who evaluated this patient. I have signed out the follow up of any pending tests (i.e. labs, radiological studies) to the ACP.  I have communicated the patient’s plan of care and disposition with the ACP.

## 2019-12-06 NOTE — ED STATDOCS - CLINICAL SUMMARY MEDICAL DECISION MAKING FREE TEXT BOX
pt with hx dvt and pe on eliquis   had chest pinching yesterday and sob( a little)  also RLE more swollen  sono labs

## 2019-12-06 NOTE — ED STATDOCS - PMH
Acute deep vein thrombosis (DVT) of right lower extremity, unspecified vein    Diverticulitis    Multiple subsegmental pulmonary emboli without acute cor pulmonale

## 2019-12-06 NOTE — ED ADULT NURSE NOTE - OBJECTIVE STATEMENT
Pt A&OX3, amb ad jaron, c/o increased swelling to RLE, where he has known DVT's, and is having sensitivity to touch and tingling.  No redness noted, only mild swelling.  Pt denies any sob, pt already on Eliquis.

## 2019-12-06 NOTE — ED STATDOCS - OBJECTIVE STATEMENT
65 yo male with hx dvt and multiple PEs in OCT  Has two clotting disorder  Here bc leg appears to more swollen to him than it was last week. Yesterday felt "something" r chest and sob . Does not have that feeling today. Felt "pinching"  Is on Eliquis and states he is compliant on eliquis  Dr Sharma is hematologist 63 yo male with hx dvt and multiple PEs in OCT  Has two clotting disorder  Here bc leg appears to more swollen to him than it was last week. Yesterday felt "something" r chest and sob . Does not have that feeling today. Felt "pinching"  Is on Eliquis and states he is compliant on eliquis  Dr Sharma is hematologist  med hx PE and DVT  soc hx non smoker

## 2019-12-06 NOTE — ED ADULT NURSE NOTE - NS ED NURSE IV DC DT
Ventricular Rate : 84  Atrial Rate : 85  P-R Interval : 190  QRS Duration : 94  Q-T Interval : 404  QTC Calculation(Bazett) : 478  P Axis : -59  R Axis : -2  T Axis : 73  Diagnosis : Sinus or ectopic atrial rhythm  Probable left atrial enlargement    Confirmed by Solomon Espinoza ([id number]) on 11/7/2019 7:10:27 AM  
06-Dec-2019 12:51

## 2019-12-07 NOTE — ED PROCEDURE NOTE - PROCEDURE ADDITIONAL DETAILS
Bedside Ultrasonography performed by resident for educational purposes. Confirmatory examination/study to follow.

## 2020-03-09 PROBLEM — I26.94 MULTIPLE SUBSEGMENTAL PULMONARY EMBOLI WITHOUT ACUTE COR PULMONALE: Chronic | Status: ACTIVE | Noted: 2019-12-06

## 2020-03-09 PROBLEM — I82.401 ACUTE EMBOLISM AND THROMBOSIS OF UNSPECIFIED DEEP VEINS OF RIGHT LOWER EXTREMITY: Chronic | Status: ACTIVE | Noted: 2019-12-06

## 2020-03-11 ENCOUNTER — APPOINTMENT (OUTPATIENT)
Dept: ORTHOPEDIC SURGERY | Facility: CLINIC | Age: 65
End: 2020-03-11
Payer: MEDICAID

## 2020-03-11 VITALS
BODY MASS INDEX: 39.2 KG/M2 | SYSTOLIC BLOOD PRESSURE: 136 MMHG | WEIGHT: 280 LBS | HEIGHT: 71 IN | HEART RATE: 71 BPM | DIASTOLIC BLOOD PRESSURE: 85 MMHG

## 2020-03-11 DIAGNOSIS — Z86.718 PERSONAL HISTORY OF OTHER VENOUS THROMBOSIS AND EMBOLISM: ICD-10-CM

## 2020-03-11 DIAGNOSIS — Z80.9 FAMILY HISTORY OF MALIGNANT NEOPLASM, UNSPECIFIED: ICD-10-CM

## 2020-03-11 DIAGNOSIS — K57.90 DIVERTICULOSIS OF INTESTINE, PART UNSPECIFIED, W/OUT PERFORATION OR ABSCESS W/OUT BLEEDING: ICD-10-CM

## 2020-03-11 DIAGNOSIS — M65.342 TRIGGER FINGER, LEFT RING FINGER: ICD-10-CM

## 2020-03-11 PROCEDURE — 99203 OFFICE O/P NEW LOW 30 MIN: CPT

## 2020-03-11 RX ORDER — DEXTROAMPHETAMINE SACCHARATE, AMPHETAMINE ASPARTATE, DEXTROAMPHETAMINE SULFATE, AND AMPHETAMINE SULFATE 7.5; 7.5; 7.5; 7.5 MG/1; MG/1; MG/1; MG/1
30 TABLET ORAL
Refills: 0 | Status: ACTIVE | COMMUNITY

## 2020-03-11 RX ORDER — ZOLPIDEM TARTRATE 5 MG/1
TABLET, FILM COATED ORAL
Refills: 0 | Status: ACTIVE | COMMUNITY

## 2020-03-11 RX ORDER — TADALAFIL 5 MG/1
5 TABLET, FILM COATED ORAL
Refills: 0 | Status: ACTIVE | COMMUNITY

## 2020-03-11 RX ORDER — APIXABAN 5 MG/1
TABLET, FILM COATED ORAL
Refills: 0 | Status: ACTIVE | COMMUNITY

## 2020-03-11 NOTE — HISTORY OF PRESENT ILLNESS
[Right] : right hand dominant [FreeTextEntry1] : He comes in today for evaluation of left ring trigger fingers. He states that there is locking and 6/10 pain with movement. He was given 3-4 prior cortisone injections by Dr. Bolanos, with his last one in October 2019. He states that these injections provided no relief. He also notes that his pain is now worse and that he is unable to make a fist. \par \par He had a pulmonary embolism in October.  He is on Eliquis.\par \par He was accompanied by his wife.

## 2020-03-11 NOTE — PHYSICAL EXAM
[de-identified] : - Constitutional: This is a male in no obvious distress.  He was accompanied by his wife today. \par - Psych: Patient is alert and oriented to person, place and time.  Patient has a normal mood and affect.\par - Cardiovascular: Normal pulses throughout the upper extremities.  No significant varicosities are noted in the upper extremities. \par - Neuro: Strength and sensation are intact throughout the upper extremities.  Patient has normal coordination.\par - Respiratory:  Patient exhibits no evidence of shortness of breath or difficulty breathing.\par - Skin: No rashes, lesions, or other abnormalities are noted in the upper extremities.\par \par ---\par \par  -  Side: Left \par -  Finger: Ring\par -  There is swelling and tenderness along the A-1 pulley of the digit.  \par -  There is evidence of active triggering with flexion and extension of the digit.\par -  There is no evidence of a PIP joint flexion contracture.  \par -  There is no evidence of triggering of the adjacent digits.  \par -  Provocative signs for carpal tunnel syndrome are negative.  \par -  There is normal strength and sensation distally along the radial, ulnar and median nerve distributions.  \par -  There is full composite range-of-motion of the other digits into the palm.

## 2020-03-11 NOTE — DISCUSSION/SUMMARY
[FreeTextEntry1] : He has findings consistent with a chronic left ring finger trigger finger.  He has had 3-4 cortisone injections by another physician in the past.\par \par I had a discussion regarding today's visit, the diagnosis, and treatment recommendations / options. At this time, I recommended surgical release. \par \par - The nature and purposes of the operation/procedure was discussed in detail. I discussed the surgical procedure in detail, as well as expected postoperative recovery and outcome. \par - Possible risks, benefits, and complications (from known and unknown causes) of the procedure were discussed in detail. \par - He was told that possible risks/complications include, but are not limited to: infection, digital nerve or vessel injury, stiffness, painful scar, poor outcome, need for additional surgical procedures, and other unforeseen complications.  He will stay on the Eliquis but he does understand that he has increased risk for swelling, hematoma and wound healing problems.\par - In addition, the possibility of an "unsuccessful outcome" despite "successful surgery," was discussed with the patient. \par - The patient fully understands these risks and wishes to proceed. \par - I had a lengthy discussion with the patient regarding today's visit, the diagnosis, and my surgical treatment recommendations. The patient has agreed to this plan of management and has expressed full understanding. All questions were fully answered to the patient's satisfaction. \par \par Over 50% of the time spent with the patient was on counseling the patient on the above diagnosis, treatment plan and prognosis.

## 2020-03-11 NOTE — ADDENDUM
[FreeTextEntry1] : I, Brennen Anderson, acted solely as a scribe for Dr. Addison Mcmahon on 03/11/2020 . \par \par All medical record entries made by the Scribe were at my, Dr. Addison Mcmahon, direction and personally dictated by me on 03/11/2020. I have personally reviewed the chart and agree that the record accurately reflects my personal performance of the history, physical exam, assessment and plan.

## 2020-03-31 ENCOUNTER — APPOINTMENT (OUTPATIENT)
Dept: ORTHOPEDIC SURGERY | Facility: HOSPITAL | Age: 65
End: 2020-03-31

## 2020-04-28 ENCOUNTER — APPOINTMENT (OUTPATIENT)
Dept: ORTHOPEDIC SURGERY | Facility: HOSPITAL | Age: 65
End: 2020-04-28

## 2020-09-06 ENCOUNTER — EMERGENCY (EMERGENCY)
Facility: HOSPITAL | Age: 65
LOS: 1 days | Discharge: HOME | End: 2020-09-06
Attending: STUDENT IN AN ORGANIZED HEALTH CARE EDUCATION/TRAINING PROGRAM
Payer: MEDICAID

## 2020-09-06 VITALS
HEART RATE: 59 BPM | SYSTOLIC BLOOD PRESSURE: 149 MMHG | RESPIRATION RATE: 18 BRPM | DIASTOLIC BLOOD PRESSURE: 92 MMHG | TEMPERATURE: 99 F | HEIGHT: 69 IN | WEIGHT: 240.08 LBS | OXYGEN SATURATION: 97 %

## 2020-09-06 DIAGNOSIS — Z90.49 ACQUIRED ABSENCE OF OTHER SPECIFIED PARTS OF DIGESTIVE TRACT: Chronic | ICD-10-CM

## 2020-09-06 DIAGNOSIS — Z98.890 OTHER SPECIFIED POSTPROCEDURAL STATES: Chronic | ICD-10-CM

## 2020-09-06 LAB
ALBUMIN SERPL ELPH-MCNC: 4 G/DL — SIGNIFICANT CHANGE UP (ref 3.3–5.2)
ALP SERPL-CCNC: 75 U/L — SIGNIFICANT CHANGE UP (ref 40–120)
ALT FLD-CCNC: 21 U/L — SIGNIFICANT CHANGE UP
ANION GAP SERPL CALC-SCNC: 12 MMOL/L — SIGNIFICANT CHANGE UP (ref 5–17)
AST SERPL-CCNC: 20 U/L — SIGNIFICANT CHANGE UP
BASOPHILS # BLD AUTO: 0.04 K/UL — SIGNIFICANT CHANGE UP (ref 0–0.2)
BASOPHILS NFR BLD AUTO: 0.6 % — SIGNIFICANT CHANGE UP (ref 0–2)
BILIRUB SERPL-MCNC: 0.3 MG/DL — LOW (ref 0.4–2)
BUN SERPL-MCNC: 24 MG/DL — HIGH (ref 8–20)
CALCIUM SERPL-MCNC: 9.1 MG/DL — SIGNIFICANT CHANGE UP (ref 8.6–10.2)
CHLORIDE SERPL-SCNC: 105 MMOL/L — SIGNIFICANT CHANGE UP (ref 98–107)
CO2 SERPL-SCNC: 25 MMOL/L — SIGNIFICANT CHANGE UP (ref 22–29)
CREAT SERPL-MCNC: 1.29 MG/DL — SIGNIFICANT CHANGE UP (ref 0.5–1.3)
EOSINOPHIL # BLD AUTO: 0.22 K/UL — SIGNIFICANT CHANGE UP (ref 0–0.5)
EOSINOPHIL NFR BLD AUTO: 3.1 % — SIGNIFICANT CHANGE UP (ref 0–6)
GLUCOSE SERPL-MCNC: 93 MG/DL — SIGNIFICANT CHANGE UP (ref 70–99)
HCT VFR BLD CALC: 41.3 % — SIGNIFICANT CHANGE UP (ref 39–50)
HGB BLD-MCNC: 13.3 G/DL — SIGNIFICANT CHANGE UP (ref 13–17)
IMM GRANULOCYTES NFR BLD AUTO: 0.3 % — SIGNIFICANT CHANGE UP (ref 0–1.5)
LYMPHOCYTES # BLD AUTO: 1.81 K/UL — SIGNIFICANT CHANGE UP (ref 1–3.3)
LYMPHOCYTES # BLD AUTO: 25.8 % — SIGNIFICANT CHANGE UP (ref 13–44)
MAGNESIUM SERPL-MCNC: 2 MG/DL — SIGNIFICANT CHANGE UP (ref 1.6–2.6)
MCHC RBC-ENTMCNC: 30.2 PG — SIGNIFICANT CHANGE UP (ref 27–34)
MCHC RBC-ENTMCNC: 32.2 GM/DL — SIGNIFICANT CHANGE UP (ref 32–36)
MCV RBC AUTO: 93.7 FL — SIGNIFICANT CHANGE UP (ref 80–100)
MONOCYTES # BLD AUTO: 0.78 K/UL — SIGNIFICANT CHANGE UP (ref 0–0.9)
MONOCYTES NFR BLD AUTO: 11.1 % — SIGNIFICANT CHANGE UP (ref 2–14)
NEUTROPHILS # BLD AUTO: 4.15 K/UL — SIGNIFICANT CHANGE UP (ref 1.8–7.4)
NEUTROPHILS NFR BLD AUTO: 59.1 % — SIGNIFICANT CHANGE UP (ref 43–77)
NT-PROBNP SERPL-SCNC: 182 PG/ML — SIGNIFICANT CHANGE UP (ref 0–300)
PLATELET # BLD AUTO: 281 K/UL — SIGNIFICANT CHANGE UP (ref 150–400)
POTASSIUM SERPL-MCNC: 4.1 MMOL/L — SIGNIFICANT CHANGE UP (ref 3.5–5.3)
POTASSIUM SERPL-SCNC: 4.1 MMOL/L — SIGNIFICANT CHANGE UP (ref 3.5–5.3)
PROT SERPL-MCNC: 6.6 G/DL — SIGNIFICANT CHANGE UP (ref 6.6–8.7)
RBC # BLD: 4.41 M/UL — SIGNIFICANT CHANGE UP (ref 4.2–5.8)
RBC # FLD: 13.2 % — SIGNIFICANT CHANGE UP (ref 10.3–14.5)
SODIUM SERPL-SCNC: 142 MMOL/L — SIGNIFICANT CHANGE UP (ref 135–145)
TROPONIN T SERPL-MCNC: <0.01 NG/ML — SIGNIFICANT CHANGE UP (ref 0–0.06)
WBC # BLD: 7.02 K/UL — SIGNIFICANT CHANGE UP (ref 3.8–10.5)
WBC # FLD AUTO: 7.02 K/UL — SIGNIFICANT CHANGE UP (ref 3.8–10.5)

## 2020-09-06 PROCEDURE — 93970 EXTREMITY STUDY: CPT | Mod: 26

## 2020-09-06 PROCEDURE — 93005 ELECTROCARDIOGRAM TRACING: CPT

## 2020-09-06 PROCEDURE — 83880 ASSAY OF NATRIURETIC PEPTIDE: CPT

## 2020-09-06 PROCEDURE — 71045 X-RAY EXAM CHEST 1 VIEW: CPT | Mod: 26

## 2020-09-06 PROCEDURE — 93010 ELECTROCARDIOGRAM REPORT: CPT

## 2020-09-06 PROCEDURE — 71045 X-RAY EXAM CHEST 1 VIEW: CPT

## 2020-09-06 PROCEDURE — 99285 EMERGENCY DEPT VISIT HI MDM: CPT

## 2020-09-06 PROCEDURE — 93970 EXTREMITY STUDY: CPT

## 2020-09-06 PROCEDURE — 80053 COMPREHEN METABOLIC PANEL: CPT

## 2020-09-06 PROCEDURE — 84484 ASSAY OF TROPONIN QUANT: CPT

## 2020-09-06 PROCEDURE — 83735 ASSAY OF MAGNESIUM: CPT

## 2020-09-06 PROCEDURE — 99284 EMERGENCY DEPT VISIT MOD MDM: CPT | Mod: 25

## 2020-09-06 PROCEDURE — 85027 COMPLETE CBC AUTOMATED: CPT

## 2020-09-06 PROCEDURE — 36415 COLL VENOUS BLD VENIPUNCTURE: CPT

## 2020-09-06 NOTE — ED PROVIDER NOTE - CLINICAL SUMMARY MEDICAL DECISION MAKING FREE TEXT BOX
Will obtain US to evaluate a change in encapsulated DVT, and obtain labs. If no significant findings patient to follow up with his oncologist and continue taking his anticoagulants.

## 2020-09-06 NOTE — ED ADULT NURSE NOTE - OBJECTIVE STATEMENT
pt A&Ox4 in NAD. respirations even and unlabored. SARAH. pt presents to ED with RLE swelling and tenderness to R ankle worsening over the past few days. Pt with hx of DVT dx in october, reports compliance with eliquis. pt taken to and returned from [facility]. pt placed on CM, NSR Hr 61. o2 sat 99% on RA. pt denies chest pain, SOB, N/V/D. PIV placed, labs drawn and sent to labs. pt aware of plan of care.

## 2020-09-06 NOTE — ED PROVIDER NOTE - PATIENT PORTAL LINK FT
You can access the FollowMyHealth Patient Portal offered by [facility] by registering at the following website: [email or web address]. By joining [employer]’s FollowMyHealth portal, you will also be able to view your health information using other applications (apps) compatible with our system.

## 2020-09-06 NOTE — ED PROVIDER NOTE - PROGRESS NOTE DETAILS
At discharge patient was resting comfortably. Labs and US results. Clarified with patient that he had another study after December at [facility] , which his hematologist indicated he had a "encapsulated Thrombosis" of the right leg. Risk and Benefits discussed regarding out-patient management versus remaining in obs and obtaining vascular consultation. Patient prefers to follow-up with his hematologist. He does also endorse that he has increase sodium intake; states he had a lot of soy sauces yesterday.

## 2020-09-06 NOTE — ED PROVIDER NOTE - NSFOLLOWUPINSTRUCTIONS_ED_ALL_ED_FT
1) Follow up with your doctor ( hematologist) in 1-2 days  2) Return to the ER for worsening or concerning symptoms  3) Bring a copy of labs to your visit    Deep Vein Thrombosis    WHAT YOU NEED TO KNOW:    Deep vein thrombosis (DVT) is a blood clot that forms in a deep vein of the body. The DVT can break into smaller pieces and travel to your lungs and cause a blockage called a pulmonary embolism. A PE can become life-threatening. It is important to go to all follow-up appointments and to take blood thinners as directed. This is especially important if you were discharged home from the emergency department.Thrombus and Embolus         DISCHARGE INSTRUCTIONS:    Call your local emergency number (911 in the [place]) if:     You feel lightheaded, short of breath, and have chest pain.      You cough up blood.    Call your doctor if:     Your arm or leg feels warm, tender, and painful. It may look swollen and red.      You have questions or concerns about your condition or care.    Medicines:     Blood thinners help prevent blood clots. Clots can cause strokes, heart attacks, and death. The following are general safety guidelines to follow while you are taking a blood thinner:  Watch for bleeding and bruising while you take blood thinners. Watch for bleeding from your gums or nose. Watch for blood in your urine and bowel movements. Use a soft washcloth on your skin, and a soft toothbrush to brush your teeth. This can keep your skin and gums from bleeding. If you shave, use an electric shaver. Do not play contact sports.       Tell your dentist and other healthcare providers that you take a blood thinner. Wear a bracelet or necklace that says you take this medicine.       Do not start or stop any other medicines unless your healthcare provider tells you to. Many medicines cannot be used with blood thinners.      Take your blood thinner exactly as prescribed by your healthcare provider. Do not skip does or take less than prescribed. Tell your provider right away if you forget to take your blood thinner, or if you take too much.      Warfarin is a blood thinner that you may need to take. The following are things you should be aware of if you take warfarin:   Foods and medicines can affect the amount of warfarin in your blood. Do not make major changes to your diet while you take warfarin. Warfarin works best when you eat about the same amount of vitamin K every day. Vitamin K is found in green leafy vegetables and certain other foods. Ask for more information about what to eat when you are taking warfarin.      You will need to see your healthcare provider for follow-up visits when you are on warfarin. You will need regular blood tests. These tests are used to decide how much medicine you need.       Take your medicine as directed. Contact your healthcare provider if you think your medicine is not helping or if you have side effects. Tell him or her if you are allergic to any medicine. Keep a list of the medicines, vitamins, and herbs you take. Include the amounts, and when and why you take them. Bring the list or the pill bottles to follow-up visits. Carry your medicine list with you in case of an emergency.    Manage a DVT:     Wear pressure stockings as directed. The stockings put pressure on your legs. This improves blood flow and helps prevent clots. Wear the stockings during the day. Do not wear them when you sleep.Pressure Stockings            Elevate your legs above the level of your heart. Elevate your legs when you sit or lie down, as often as you can. This will help decrease swelling and pain. Prop your legs on pillows or blankets to keep them elevated comfortably. Elevate Leg         Prevent a DVT:     Exercise regularly to help increase your blood flow. Walking is a good low-impact exercise. Talk to your healthcare provider about the best exercise plan for you. Walking for Exercise           Change your body position or move around often. Move and stretch in your seat several times each hour if you travel by car or work at a desk. In an airplane, get up and walk every hour. Move your legs by tightening and releasing your leg muscles while sitting. You can move your legs while sitting by raising and lowering your heels. Keep your toes on the floor while you do this. You can also raise and lower your toes while keeping your heels on the floor.DVT Prevention Heel Raise     DVT Prevention Toe Raise           Maintain a healthy weight. Ask your healthcare provider how much you should weigh. Ask him or her to help you create a weight loss plan if you are overweight.       Do not smoke. Nicotine and other chemicals in cigarettes and cigars can damage blood vessels and make it more difficult to manage your DVT. Ask your healthcare provider for information if you currently smoke and need help to quit. E-cigarettes or smokeless tobacco still contain nicotine. Talk to your healthcare provider before you use these products.      Ask about birth control if you are a woman who takes the pill. A birth control pill increases the risk for PE in certain women. The risk is higher if you are also older than 35, smoke cigarettes, or have a blood clotting disorder. Talk to your healthcare provider about other ways to prevent pregnancy, such as a cervical cap or intrauterine device (IUD).    Follow up with your doctor or specialist as directed: You may need to come in regularly for scans to check for blood clots. Your blood may checked to see how long it takes to clot. Your doctor or specialist will tell you if you need to have this test and how often to have it. Write down your questions so you remember to ask them during your visits.    Leg Edema    WHAT YOU NEED TO KNOW:    Leg edema is swelling caused by fluid buildup. Your legs may swell if you sit or stand for long periods of time, are pregnant, or are injured. Swelling may also occur if you have heart failure or circulation problems. This means that your heart does not pump blood through your body as it should.    DISCHARGE INSTRUCTIONS:    Call your local emergency number (911 in the ) for any of the following:     You cannot walk.      You have chest pain or trouble breathing that is worse when you lie down.      You suddenly feel lightheaded and have trouble breathing.      You have new and sudden chest pain. You may have more pain when you take deep breaths or cough.      You cough up blood.    Return to the emergency department if:     You feel faint or confused.      Your skin turns blue or gray.      Your leg feels warm, tender, and painful. It may be swollen and red.    Call your doctor if:     You have a fever or feel more tired than usual.      The veins in your legs look larger than usual. They may look full or bulging.      Your legs itch or feel heavy.      You have red or white areas or sores on your legs. The skin may also appear dimpled or have indentations.      You are gaining weight.      You have trouble moving your ankles.      The swelling does not go away, or other parts of your body swell.      You have questions or concerns about your condition or care.    Self-care:     Elevate your legs. Raise your legs above the level of your heart as often as you can. This will help decrease swelling and pain. Prop your legs on pillows or blankets to keep them elevated comfortably.Elevate Leg           Wear pressure stockings, if directed. These tight stockings put pressure on your legs to promote blood flow and prevent blood clots. Put them on before you get out of bed. Wear the stockings during the day. Do not wear them while you sleep.Pressure Stockings            Stay active. Do not stand or sit for long periods of time. Ask your healthcare provider about the best exercise plan for you.Walking for Exercise           Eat healthy foods. Healthy foods include fruits, vegetables, whole-grain breads, low-fat dairy products, beans, lean meats, and fish. Ask if you need to be on a special diet.Healthy Foods           Limit sodium (salt). Salt will make your body hold even more fluid. Your healthcare provider will tell you how many milligrams (mg) of salt you can have each day.         Follow up with your doctor as directed: Write down your questions so you remember to ask them during your visits.

## 2020-09-06 NOTE — ED PROVIDER NOTE - CARE PLAN
Principal Discharge DX:	Ankle edema  Secondary Diagnosis:	Chronic deep vein thrombosis (DVT) of distal vein of right lower extremity

## 2020-09-06 NOTE — ED PROVIDER NOTE - MUSCULOSKELETAL, MLM
Spine appears normal, range of motion is not limited, no muscle or joint tenderness. Mild increase in size of right ankle than left.

## 2020-09-06 NOTE — ED PROVIDER NOTE - OBJECTIVE STATEMENT
64 y/o M pt with significant PMHx of DVT presents to the ED c/o right lower extremity edema. Patient was diagnosed with a DVT in the right leg in October, and [place] the beach he noticed that his leg appeared swollen. Patient states that he has also had multiple small embolisms which have resolved as shown on a CT done at [facility] a month ago. For the past week he has been experiencing lower back pain and lower extremity numbness when he stands for an extended period of time. Currently on Eliquis BID. During COVID he does state that he has gained ~20lbs. Denies N/V, CP, SOB, bladder/bowel incontinence or fever. No further complaints at this time.

## 2020-09-09 ENCOUNTER — APPOINTMENT (OUTPATIENT)
Dept: ORTHOPEDIC SURGERY | Facility: CLINIC | Age: 65
End: 2020-09-09

## 2020-10-26 ENCOUNTER — INPATIENT (INPATIENT)
Facility: HOSPITAL | Age: 65
LOS: 0 days | Discharge: HOME | DRG: 392 | End: 2020-10-27
Attending: INTERNAL MEDICINE | Admitting: STUDENT IN AN ORGANIZED HEALTH CARE EDUCATION/TRAINING PROGRAM
Payer: MEDICAID

## 2020-10-26 VITALS
HEART RATE: 83 BPM | OXYGEN SATURATION: 96 % | DIASTOLIC BLOOD PRESSURE: 89 MMHG | SYSTOLIC BLOOD PRESSURE: 131 MMHG | RESPIRATION RATE: 18 BRPM | WEIGHT: 233.03 LBS | HEIGHT: 69 IN | TEMPERATURE: 98 F

## 2020-10-26 DIAGNOSIS — Z98.890 OTHER SPECIFIED POSTPROCEDURAL STATES: Chronic | ICD-10-CM

## 2020-10-26 DIAGNOSIS — Z90.49 ACQUIRED ABSENCE OF OTHER SPECIFIED PARTS OF DIGESTIVE TRACT: Chronic | ICD-10-CM

## 2020-10-26 DIAGNOSIS — I24.9 ACUTE ISCHEMIC HEART DISEASE, UNSPECIFIED: ICD-10-CM

## 2020-10-26 LAB
ALBUMIN SERPL ELPH-MCNC: 3.9 G/DL — SIGNIFICANT CHANGE UP (ref 3.3–5.2)
ALP SERPL-CCNC: 73 U/L — SIGNIFICANT CHANGE UP (ref 40–120)
ALT FLD-CCNC: 43 U/L — HIGH
ANION GAP SERPL CALC-SCNC: 13 MMOL/L — SIGNIFICANT CHANGE UP (ref 5–17)
AST SERPL-CCNC: 27 U/L — SIGNIFICANT CHANGE UP
BASOPHILS # BLD AUTO: 0.02 K/UL — SIGNIFICANT CHANGE UP (ref 0–0.2)
BASOPHILS NFR BLD AUTO: 0.3 % — SIGNIFICANT CHANGE UP (ref 0–2)
BILIRUB SERPL-MCNC: 0.5 MG/DL — SIGNIFICANT CHANGE UP (ref 0.4–2)
BUN SERPL-MCNC: 31 MG/DL — HIGH (ref 8–20)
CALCIUM SERPL-MCNC: 9.1 MG/DL — SIGNIFICANT CHANGE UP (ref 8.6–10.2)
CHLORIDE SERPL-SCNC: 103 MMOL/L — SIGNIFICANT CHANGE UP (ref 98–107)
CO2 SERPL-SCNC: 27 MMOL/L — SIGNIFICANT CHANGE UP (ref 22–29)
CREAT SERPL-MCNC: 1.56 MG/DL — HIGH (ref 0.5–1.3)
EOSINOPHIL # BLD AUTO: 0.12 K/UL — SIGNIFICANT CHANGE UP (ref 0–0.5)
EOSINOPHIL NFR BLD AUTO: 1.6 % — SIGNIFICANT CHANGE UP (ref 0–6)
GLUCOSE SERPL-MCNC: 96 MG/DL — SIGNIFICANT CHANGE UP (ref 70–99)
HCT VFR BLD CALC: 45.3 % — SIGNIFICANT CHANGE UP (ref 39–50)
HGB BLD-MCNC: 14.8 G/DL — SIGNIFICANT CHANGE UP (ref 13–17)
IMM GRANULOCYTES NFR BLD AUTO: 0.3 % — SIGNIFICANT CHANGE UP (ref 0–1.5)
INR BLD: 1.27 RATIO — HIGH (ref 0.88–1.16)
LIDOCAIN IGE QN: 44 U/L — SIGNIFICANT CHANGE UP (ref 22–51)
LYMPHOCYTES # BLD AUTO: 1.24 K/UL — SIGNIFICANT CHANGE UP (ref 1–3.3)
LYMPHOCYTES # BLD AUTO: 16.9 % — SIGNIFICANT CHANGE UP (ref 13–44)
MAGNESIUM SERPL-MCNC: 1.8 MG/DL — SIGNIFICANT CHANGE UP (ref 1.6–2.6)
MCHC RBC-ENTMCNC: 30 PG — SIGNIFICANT CHANGE UP (ref 27–34)
MCHC RBC-ENTMCNC: 32.7 GM/DL — SIGNIFICANT CHANGE UP (ref 32–36)
MCV RBC AUTO: 91.7 FL — SIGNIFICANT CHANGE UP (ref 80–100)
MONOCYTES # BLD AUTO: 0.73 K/UL — SIGNIFICANT CHANGE UP (ref 0–0.9)
MONOCYTES NFR BLD AUTO: 9.9 % — SIGNIFICANT CHANGE UP (ref 2–14)
NEUTROPHILS # BLD AUTO: 5.22 K/UL — SIGNIFICANT CHANGE UP (ref 1.8–7.4)
NEUTROPHILS NFR BLD AUTO: 71 % — SIGNIFICANT CHANGE UP (ref 43–77)
NT-PROBNP SERPL-SCNC: 269 PG/ML — SIGNIFICANT CHANGE UP (ref 0–300)
PLATELET # BLD AUTO: 211 K/UL — SIGNIFICANT CHANGE UP (ref 150–400)
POTASSIUM SERPL-MCNC: 4.3 MMOL/L — SIGNIFICANT CHANGE UP (ref 3.5–5.3)
POTASSIUM SERPL-SCNC: 4.3 MMOL/L — SIGNIFICANT CHANGE UP (ref 3.5–5.3)
PROT SERPL-MCNC: 6.5 G/DL — LOW (ref 6.6–8.7)
PROTHROM AB SERPL-ACNC: 14.6 SEC — HIGH (ref 10.6–13.6)
RBC # BLD: 4.94 M/UL — SIGNIFICANT CHANGE UP (ref 4.2–5.8)
RBC # FLD: 13.1 % — SIGNIFICANT CHANGE UP (ref 10.3–14.5)
SARS-COV-2 RNA SPEC QL NAA+PROBE: SIGNIFICANT CHANGE UP
SODIUM SERPL-SCNC: 143 MMOL/L — SIGNIFICANT CHANGE UP (ref 135–145)
TROPONIN T SERPL-MCNC: <0.01 NG/ML — SIGNIFICANT CHANGE UP (ref 0–0.06)
WBC # BLD: 7.35 K/UL — SIGNIFICANT CHANGE UP (ref 3.8–10.5)
WBC # FLD AUTO: 7.35 K/UL — SIGNIFICANT CHANGE UP (ref 3.8–10.5)

## 2020-10-26 PROCEDURE — 93010 ELECTROCARDIOGRAM REPORT: CPT

## 2020-10-26 PROCEDURE — 71046 X-RAY EXAM CHEST 2 VIEWS: CPT | Mod: 26

## 2020-10-26 PROCEDURE — 99223 1ST HOSP IP/OBS HIGH 75: CPT

## 2020-10-26 PROCEDURE — 71275 CT ANGIOGRAPHY CHEST: CPT | Mod: 26

## 2020-10-26 PROCEDURE — 99285 EMERGENCY DEPT VISIT HI MDM: CPT

## 2020-10-26 RX ORDER — ASPIRIN/CALCIUM CARB/MAGNESIUM 324 MG
325 TABLET ORAL ONCE
Refills: 0 | Status: COMPLETED | OUTPATIENT
Start: 2020-10-26 | End: 2020-10-26

## 2020-10-26 RX ADMIN — Medication 325 MILLIGRAM(S): at 23:18

## 2020-10-26 NOTE — H&P ADULT - ASSESSMENT
65yoF hx RLE DVT and PE (10/2019) on Eliquis, ADHD, chronic fatigue syndrome on Adderall PRN, former smoker, GERD presenting with intermittent chest pain, exertional dyspnea who came to hospital after he woke up with severe episode of midsternal chest pain      Chest pain  -Concern for unstable angina, troponin negative, EKG shows NSR, HR 81  -Seen by cards in ED, plan for cath in AM  -NPO after midnight in preparation for cath  -Trend troponin, EKG  -Given ASA 325mg in ED, will continue  -Check HgbA1c, lipid panel  -TTE ordered  -Telemetry monitoring    Hx RLE DVT/PE  -On Eliquis, as per cards will hold until after cath    CARMEN on ?CKD  -Admission BUN/Cr 31/1.56, renal function has fluctuated between normal values and elevated around 1.4 dating back to 2018  -Hold Lasix, give 500cc bolus and repeat BMP in AM    Hx leg swelling  -Holding PO Lasix due to CARMEN    ADHD and chronic fatigue syndrome  -Adderall PRN    GERD  -PPI resumed    Prophylactic measure  -Holding Eliquis as above

## 2020-10-26 NOTE — ED PROVIDER NOTE - SKIN, MLM
Skin normal color for race, warm, dry and intact. No evidence of rash. No edema in lower extremities

## 2020-10-26 NOTE — CONSULT NOTE ADULT - SUBJECTIVE AND OBJECTIVE BOX
[place] [facility], THE HEART CENTER                                   [address] [place] [address]                                                      PHONE: [phone]                                                         FAX: [phone]  [email or web address]  ---------------------------------------------------------------------------------------------------------------------------------    HPI:  OLESYA MARR is an 65y Male PMHX HTn, HLD, PE and acute DVT on Eliquis, LE edema on lasix, former smoker who presents to [facility] ED with CP.  Pt with CP starting this afternoon, 5/10, mid sternal, pressure associated with sob, and nausea and vomiting.        PAST MEDICAL & SURGICAL HISTORY:  Multiple subsegmental pulmonary emboli without acute cor pulmonale    Acute deep vein thrombosis (DVT) of right lower extremity, unspecified vein    Diverticulitis    S/P colon resection  (7/2018)    S/P nasal septoplasty        Zosyn (Hives)      MEDICATIONS  (STANDING):    MEDICATIONS  (PRN):      Family History: Pt denies hx of early cad, SCD, or congenital heart disease.      Social History:  Cigarettes:   former                 Alchohol:   no              Illicit Drug Abuse:  no    ROS:    Extensively Reviewed with pertinents as per HPI the remainder were negative.      Vital Signs Last 24 Hrs  T(C): 36.8 (26 Oct 2020 18:21), Max: 36.8 (26 Oct 2020 18:21)  T(F): 98.2 (26 Oct 2020 18:21), Max: 98.2 (26 Oct 2020 18:21)  HR: 83 (26 Oct 2020 18:21) (83 - 83)  BP: 131/89 (26 Oct 2020 18:21) (131/89 - 131/89)  BP(mean): --  RR: 18 (26 Oct 2020 18:21) (18 - 18)  SpO2: 96% (26 Oct 2020 18:21) (96% - 96%)  ICU Vital Signs Last 24 Hrs  OLESYA MARR  I&O's Detail    I&O's Summary    Drug Dosing Weight  OLESYA MARR      PHYSICAL EXAM:  General: Appears well developed, well nourished alert and cooperative.  HEENT: Head; normocephalic, atraumatic.  Eyes: Pupils reactive, cornea wnl.  Neck: Supple, no nodes adenopathy, no NVD or carotid bruit or thyromegaly.  CARDIOVASCULAR: Normal S1 and S2, No murmur, rub, gallop or lift.   LUNGS: No rales, rhonchi or wheeze. Normal breath sounds bilaterally.  ABDOMEN: Soft, nontender without mass or organomegaly. bowel sounds normoactive.  EXTREMITIES: No clubbing, cyanosis or edema. Distal pulses wnl.   SKIN: warm and dry with normal turgor.  NEURO: Alert/oriented x 3/normal motor exam. No pathologic reflexes.    PSYCH: normal affect.        LABS:             RADIOLOGY & ADDITIONAL STUDIES:    INTERPRETATION OF TELEMETRY (personally reviewed):    ECG:   nsr, nml axis, no st elevations/depressions, pvc     Assessment and Plan:  In summary, OLESYA MARR is an 65y Male PMHX HTn, HLD, PE and acute DVT on Eliquis, LE edema on lasix, former smoker who presents to [facility] ED with CP.  Pt with CP starting this afternoon, 5/10, mid sternal, pressure associated with sob, and nausea and vomiting.      Chest pain:  -labs pending   -trend trops/ekgs     Thank you for allowing [facility] to participate in the care of this patient.  Please feel free to call with any questions or concerns. [facility], THE HEART CENTER                                   [address] [place] [address]                                                      PHONE: [phone]                                                         FAX: [phone]  [email or web address]  ---------------------------------------------------------------------------------------------------------------------------------    HPI:c      PAST MEDICAL & SURGICAL HISTORY:  Multiple subsegmental pulmonary emboli without acute cor pulmonale    Acute deep vein thrombosis (DVT) of right lower extremity, unspecified vein    Diverticulitis    S/P colon resection  (7/2018)    S/P nasal septoplasty        Zosyn (Hives)      MEDICATIONS  (STANDING):    MEDICATIONS  (PRN):      Family History: Pt denies hx of early cad, SCD, or congenital heart disease.      Social History:  Cigarettes:   former                 Alchohol:   no              Illicit Drug Abuse:  no    ROS:    Extensively Reviewed with pertinents as per HPI the remainder were negative.      Vital Signs Last 24 Hrs  T(C): 36.8 (26 Oct 2020 18:21), Max: 36.8 (26 Oct 2020 18:21)  T(F): 98.2 (26 Oct 2020 18:21), Max: 98.2 (26 Oct 2020 18:21)  HR: 83 (26 Oct 2020 18:21) (83 - 83)  BP: 131/89 (26 Oct 2020 18:21) (131/89 - 131/89)  BP(mean): --  RR: 18 (26 Oct 2020 18:21) (18 - 18)  SpO2: 96% (26 Oct 2020 18:21) (96% - 96%)  ICU Vital Signs Last 24 Hrs  OLESYA MARR  I&O's Detail    I&O's Summary    Drug Dosing Weight  OLESYA MARR      PHYSICAL EXAM:  General: Appears well developed, well nourished alert and cooperative.  HEENT: Head; normocephalic, atraumatic.  Eyes: Pupils reactive, cornea wnl.  Neck: Supple, no nodes adenopathy, no NVD or carotid bruit or thyromegaly.  CARDIOVASCULAR: Normal S1 and S2, No murmur, rub, gallop or lift.   LUNGS: No rales, rhonchi or wheeze. Normal breath sounds bilaterally.  ABDOMEN: Soft, nontender without mass or organomegaly. bowel sounds normoactive.  EXTREMITIES: No clubbing, cyanosis or edema. Distal pulses wnl.   SKIN: warm and dry with normal turgor.  NEURO: Alert/oriented x 3/normal motor exam. No pathologic reflexes.    PSYCH: normal affect.        LABS:    RADIOLOGY & ADDITIONAL STUDIES:    INTERPRETATION OF TELEMETRY (personally reviewed):    ECG:  nsr, nml axis, no st elevations/depressions, pvc     Assessment and Plan:  In summary, OLESYA MARR is an 65y Male PMHX HTn, HLD, PE and acute DVT on Eliquis, LE edema on lasix, former smoker who presents to [facility] ED with CP.  Pt with CP starting this afternoon, 5/10, mid sternal, pressure associated with sob, and nausea and vomiting.      Chest pain:  -labs pending   -trend trops/ekgs   -Hold Eliquis until after cath (last dose on 10/26 AM)  -cath in am  -npo after midnight  -echo  -tele    Thank you for allowing [facility] to participate in the care of this patient.  Please feel free to call with any questions or concerns.

## 2020-10-26 NOTE — H&P ADULT - NSICDXFAMILYHX_GEN_ALL_CORE_FT
FAMILY HISTORY:  Family history of breast cancer  FH: CAD (coronary artery disease), grandmother

## 2020-10-26 NOTE — ED ADULT NURSE NOTE - OBJECTIVE STATEMENT
pt reports severe mid sternal chest pressure starting this afternoon and becoming more severe over a number of hours. pt said when the pain was at its apex he became severely nauseous and projectile vomited. pt denies symptoms at this time. a and o x3. breathing even and unlabored. nsr on cm. no le edema. pt has no other complaints at this time. sitting calm in bed. will continue to monitor.

## 2020-10-26 NOTE — ED ADULT NURSE REASSESSMENT NOTE - NS ED NURSE REASSESS COMMENT FT1
Assumed care of pt at 1915.  Pt able to make needs known,  Pt on cardiac monitor. No complaints at this time,  Will continue to monitor

## 2020-10-26 NOTE — ED ADULT NURSE NOTE - NSIMPLEMENTINTERV_GEN_ALL_ED
Implemented All Universal Safety Interventions:  [place] to call system. Call bell, personal items and telephone within reach. Instruct patient to call for assistance. Room bathroom lighting operational. Non-slip footwear when patient is off stretcher. Physically safe environment: no spills, clutter or unnecessary equipment. Stretcher in lowest position, wheels locked, appropriate side rails in place.

## 2020-10-26 NOTE — H&P ADULT - NSHPPHYSICALEXAM_GEN_ALL_CORE
Vital Signs Last 24 Hrs  T(C): 36.5 (27 Oct 2020 00:37), Max: 36.8 (26 Oct 2020 18:21)  T(F): 97.7 (27 Oct 2020 00:37), Max: 98.2 (26 Oct 2020 18:21)  HR: 63 (27 Oct 2020 00:37) (63 - 83)  BP: 101/65 (27 Oct 2020 00:37) (101/65 - 131/89)  BP(mean): --  RR: 18 (27 Oct 2020 00:37) (18 - 18)  SpO2: 94% (27 Oct 2020 00:37) (94% - 96%)    GENERAL:  Well-appearing male, not in acute distress  EYES:  Clear conjunctiva, extraocular movement intact  ENT: Moist mucous membranes  RESP:  Non-labored breathing pattern, lungs clear to ausculation  CV: Regular rate and rhythm, no murmurs appreciated, no lower extremity edema  GI: Soft, non-tender, non-distended  NEURO: Awake, alert, conversant, upper and lower extremity strength 5/5, light touch sensation grossly intact  PSYCH: Calm, cooperative  SKIN: No rash or lesions, warm and dry

## 2020-10-26 NOTE — ED ADULT NURSE NOTE - NS ED NURSE LEVEL OF CONSCIOUSNESS SPEECH
[No Acute Distress] : no acute distress [Well Nourished] : well nourished [Well Developed] : well developed Speaking Coherently [Well-Appearing] : well-appearing [Normal Sclera/Conjunctiva] : normal sclera/conjunctiva [PERRL] : pupils equal round and reactive to light [EOMI] : extraocular movements intact [Normal Outer Ear/Nose] : the outer ears and nose were normal in appearance [Normal Oropharynx] : the oropharynx was normal [No JVD] : no jugular venous distention [Supple] : supple [No Lymphadenopathy] : no lymphadenopathy [Thyroid Normal, No Nodules] : the thyroid was normal and there were no nodules present [No Respiratory Distress] : no respiratory distress  [No Accessory Muscle Use] : no accessory muscle use [Clear to Auscultation] : lungs were clear to auscultation bilaterally [Regular Rhythm] : with a regular rhythm [Normal Rate] : normal rate  [Normal S1, S2] : normal S1 and S2 [No Murmur] : no murmur heard [No Carotid Bruits] : no carotid bruits [No Abdominal Bruit] : a ~M bruit was not heard ~T in the abdomen [Pedal Pulses Present] : the pedal pulses are present [No Varicosities] : no varicosities [No Edema] : there was no peripheral edema [No Palpable Aorta] : no palpable aorta [Soft] : abdomen soft [No Extremity Clubbing/Cyanosis] : no extremity clubbing/cyanosis [Non Tender] : non-tender [Non-distended] : non-distended [No Masses] : no abdominal mass palpated [No HSM] : no HSM [Normal Bowel Sounds] : normal bowel sounds [Normal Posterior Cervical Nodes] : no posterior cervical lymphadenopathy [Normal Anterior Cervical Nodes] : no anterior cervical lymphadenopathy [No CVA Tenderness] : no CVA  tenderness [No Spinal Tenderness] : no spinal tenderness [No Joint Swelling] : no joint swelling [Grossly Normal Strength/Tone] : grossly normal strength/tone [No Rash] : no rash [Coordination Grossly Intact] : coordination grossly intact [No Focal Deficits] : no focal deficits [Normal Gait] : normal gait [Normal Affect] : the affect was normal [Normal Insight/Judgement] : insight and judgment were intact

## 2020-10-26 NOTE — ED PROVIDER NOTE - CLINICAL SUMMARY MEDICAL DECISION MAKING FREE TEXT BOX
patient with fatigue, episode of diaphoresis, episode of chest discomfort at rest concerning for acs, recommended remains for inpatient cardio eval, however due to patient having important meeting in morning if two sets are negative he wants to leave and will return tomorrow or follow up with [place] cardiology promptly.

## 2020-10-26 NOTE — ED PROVIDER NOTE - PROGRESS NOTE DETAILS
Dr. Beauchamp evaluated the patient and request admission and will take patient for cardiac cath tomorrow.

## 2020-10-26 NOTE — H&P ADULT - NSHPSOCIALHISTORY_GEN_ALL_CORE
Lives with girlfriend  Former smoker, 20-30 pack years, quit ~2010  Remote marijuana use  Denies EtOH or drug use

## 2020-10-26 NOTE — H&P ADULT - NSHPLABSRESULTS_GEN_ALL_CORE
14.8   7.35  )-----------( 211      ( 26 Oct 2020 21:11 )             45.3       10-26    143  |  103  |  31.0<H>  ----------------------------<  96  4.3   |  27.0  |  1.56<H>    Ca    9.1      26 Oct 2020 21:11  Mg     1.8     10-26    TPro  6.5<L>  /  Alb  3.9  /  TBili  0.5  /  DBili  x   /  AST  27  /  ALT  43<H>  /  AlkPhos  73  10-26

## 2020-10-26 NOTE — H&P ADULT - NSICDXPASTMEDICALHX_GEN_ALL_CORE_FT
PAST MEDICAL HISTORY:  Acute deep vein thrombosis (DVT) of right lower extremity, unspecified vein     Diverticulitis     Multiple subsegmental pulmonary emboli without acute cor pulmonale

## 2020-10-26 NOTE — ED PROVIDER NOTE - OBJECTIVE STATEMENT
66 y/o male with PMHx of DVT, Diverticulitis, and PE presents to ED c/o cp. Patient states this morning he didn't feel great and this afternoon he felt like someone was sitting on his chest for a couple of hours. Patient states he felt nauseous driving home today and had to stop to vomit. Patient states he always feels fatigued, has been short of breath for a long time, and  nothing is making his pain better or worse. Patient also states he perspires profusely on little exertion. His feet swelled in August and he came in to this hospital to have a doppler ordered. He was put on Lasix and set for a cardiac workup. Has seen cardiologist Dr. Beauchamp at [facility]. Patient is a previous smoker who quit around 8 years ago.    denies: diabetes, htn

## 2020-10-26 NOTE — H&P ADULT - HISTORY OF PRESENT ILLNESS
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  Pt reports intermittent episodes of midsternal chest pain occurring ‘for a while’ that he thought was related to indigestion and GERD. Earlier this morning, he had a severe episode of non-radiating, midsternal chest pressure that started after he woke up and lasted for several hours which prompted him to come to the ED.  The chest pain persisted while driving and was associated with an episode of emesis. Pt also reports some exertional dyspnea and mild diaphoresis   that was sometimes associated with his intermittent chest pain. Pt reports onset of bilateral ankle and leg edema that began back in August. He was placed on PO Lasix 20mg with improvement in his leg swelling.  Pt denies any orthopnea, weight gain. He reports prior NST about 25 years ago that he says was normal and has never had a prior cath.

## 2020-10-26 NOTE — ED ADULT NURSE NOTE - CHPI ED NUR SYMPTOMS NEG
no shortness of breath/no chills/no back pain/no diaphoresis/no congestion/no dizziness/no syncope/no fever/no nausea

## 2020-10-27 ENCOUNTER — TRANSCRIPTION ENCOUNTER (OUTPATIENT)
Age: 65
End: 2020-10-27

## 2020-10-27 VITALS
HEART RATE: 59 BPM | SYSTOLIC BLOOD PRESSURE: 117 MMHG | OXYGEN SATURATION: 97 % | TEMPERATURE: 98 F | DIASTOLIC BLOOD PRESSURE: 72 MMHG | RESPIRATION RATE: 18 BRPM

## 2020-10-27 LAB
A1C WITH ESTIMATED AVERAGE GLUCOSE RESULT: 5.9 % — HIGH (ref 4–5.6)
ANION GAP SERPL CALC-SCNC: 12 MMOL/L — SIGNIFICANT CHANGE UP (ref 5–17)
BUN SERPL-MCNC: 31 MG/DL — HIGH (ref 8–20)
CALCIUM SERPL-MCNC: 8.4 MG/DL — LOW (ref 8.6–10.2)
CHLORIDE SERPL-SCNC: 107 MMOL/L — SIGNIFICANT CHANGE UP (ref 98–107)
CHOLEST SERPL-MCNC: 178 MG/DL — SIGNIFICANT CHANGE UP
CK MB CFR SERPL CALC: 3 NG/ML — SIGNIFICANT CHANGE UP (ref 0–6.7)
CK SERPL-CCNC: 275 U/L — HIGH (ref 30–200)
CO2 SERPL-SCNC: 24 MMOL/L — SIGNIFICANT CHANGE UP (ref 22–29)
CREAT SERPL-MCNC: 1.51 MG/DL — HIGH (ref 0.5–1.3)
ESTIMATED AVERAGE GLUCOSE: 123 MG/DL — HIGH (ref 68–114)
GLUCOSE SERPL-MCNC: 116 MG/DL — HIGH (ref 70–99)
HDLC SERPL-MCNC: 34 MG/DL — LOW
LIPID PNL WITH DIRECT LDL SERPL: 119 MG/DL — HIGH
NON HDL CHOLESTEROL: 144 MG/DL — HIGH
POTASSIUM SERPL-MCNC: 3.7 MMOL/L — SIGNIFICANT CHANGE UP (ref 3.5–5.3)
POTASSIUM SERPL-SCNC: 3.7 MMOL/L — SIGNIFICANT CHANGE UP (ref 3.5–5.3)
SODIUM SERPL-SCNC: 143 MMOL/L — SIGNIFICANT CHANGE UP (ref 135–145)
TRIGL SERPL-MCNC: 125 MG/DL — SIGNIFICANT CHANGE UP
TROPONIN T SERPL-MCNC: <0.01 NG/ML — SIGNIFICANT CHANGE UP (ref 0–0.06)

## 2020-10-27 PROCEDURE — 83735 ASSAY OF MAGNESIUM: CPT

## 2020-10-27 PROCEDURE — C8929: CPT

## 2020-10-27 PROCEDURE — 71046 X-RAY EXAM CHEST 2 VIEWS: CPT

## 2020-10-27 PROCEDURE — 83690 ASSAY OF LIPASE: CPT

## 2020-10-27 PROCEDURE — 85610 PROTHROMBIN TIME: CPT

## 2020-10-27 PROCEDURE — 87635 SARS-COV-2 COVID-19 AMP PRB: CPT

## 2020-10-27 PROCEDURE — 83880 ASSAY OF NATRIURETIC PEPTIDE: CPT

## 2020-10-27 PROCEDURE — 83036 HEMOGLOBIN GLYCOSYLATED A1C: CPT

## 2020-10-27 PROCEDURE — 82550 ASSAY OF CK (CPK): CPT

## 2020-10-27 PROCEDURE — 71275 CT ANGIOGRAPHY CHEST: CPT

## 2020-10-27 PROCEDURE — 99239 HOSP IP/OBS DSCHRG MGMT >30: CPT

## 2020-10-27 PROCEDURE — 80048 BASIC METABOLIC PNL TOTAL CA: CPT

## 2020-10-27 PROCEDURE — 99285 EMERGENCY DEPT VISIT HI MDM: CPT

## 2020-10-27 PROCEDURE — 86769 SARS-COV-2 COVID-19 ANTIBODY: CPT

## 2020-10-27 PROCEDURE — 84484 ASSAY OF TROPONIN QUANT: CPT

## 2020-10-27 PROCEDURE — 80053 COMPREHEN METABOLIC PANEL: CPT

## 2020-10-27 PROCEDURE — 36415 COLL VENOUS BLD VENIPUNCTURE: CPT

## 2020-10-27 PROCEDURE — 85025 COMPLETE CBC W/AUTO DIFF WBC: CPT

## 2020-10-27 PROCEDURE — 82553 CREATINE MB FRACTION: CPT

## 2020-10-27 PROCEDURE — 93306 TTE W/DOPPLER COMPLETE: CPT | Mod: 26

## 2020-10-27 PROCEDURE — 80061 LIPID PANEL: CPT

## 2020-10-27 PROCEDURE — 93005 ELECTROCARDIOGRAM TRACING: CPT

## 2020-10-27 RX ORDER — DEXTROAMPHETAMINE SACCHARATE, AMPHETAMINE ASPARTATE, DEXTROAMPHETAMINE SULFATE AND AMPHETAMINE SULFATE 1.875; 1.875; 1.875; 1.875 MG/1; MG/1; MG/1; MG/1
10 TABLET ORAL
Refills: 0 | Status: DISCONTINUED | OUTPATIENT
Start: 2020-10-27 | End: 2020-10-27

## 2020-10-27 RX ORDER — PANTOPRAZOLE SODIUM 20 MG/1
40 TABLET, DELAYED RELEASE ORAL
Refills: 0 | Status: DISCONTINUED | OUTPATIENT
Start: 2020-10-27 | End: 2020-10-27

## 2020-10-27 RX ORDER — ASPIRIN/CALCIUM CARB/MAGNESIUM 324 MG
1 TABLET ORAL
Qty: 30 | Refills: 0
Start: 2020-10-27 | End: 2020-11-25

## 2020-10-27 RX ORDER — APIXABAN 2.5 MG/1
1 TABLET, FILM COATED ORAL
Qty: 0 | Refills: 0 | DISCHARGE

## 2020-10-27 RX ORDER — ZOLPIDEM TARTRATE 10 MG/1
5 TABLET ORAL AT BEDTIME
Refills: 0 | Status: DISCONTINUED | OUTPATIENT
Start: 2020-10-27 | End: 2020-10-27

## 2020-10-27 RX ORDER — ATORVASTATIN CALCIUM 80 MG/1
1 TABLET, FILM COATED ORAL
Qty: 30 | Refills: 0
Start: 2020-10-27 | End: 2020-11-25

## 2020-10-27 RX ORDER — DEXTROAMPHETAMINE SACCHARATE, AMPHETAMINE ASPARTATE, DEXTROAMPHETAMINE SULFATE AND AMPHETAMINE SULFATE 1.875; 1.875; 1.875; 1.875 MG/1; MG/1; MG/1; MG/1
1 TABLET ORAL
Qty: 0 | Refills: 0 | DISCHARGE

## 2020-10-27 RX ORDER — SODIUM CHLORIDE 9 MG/ML
500 INJECTION INTRAMUSCULAR; INTRAVENOUS; SUBCUTANEOUS ONCE
Refills: 0 | Status: COMPLETED | OUTPATIENT
Start: 2020-10-27 | End: 2020-10-27

## 2020-10-27 RX ORDER — OMEPRAZOLE 10 MG/1
1 CAPSULE, DELAYED RELEASE ORAL
Qty: 0 | Refills: 0 | DISCHARGE

## 2020-10-27 RX ORDER — ASPIRIN/CALCIUM CARB/MAGNESIUM 324 MG
81 TABLET ORAL DAILY
Refills: 0 | Status: DISCONTINUED | OUTPATIENT
Start: 2020-10-27 | End: 2020-10-27

## 2020-10-27 RX ORDER — PANTOPRAZOLE SODIUM 20 MG/1
1 TABLET, DELAYED RELEASE ORAL
Qty: 30 | Refills: 0
Start: 2020-10-27 | End: 2020-11-25

## 2020-10-27 RX ORDER — FUROSEMIDE 40 MG
1 TABLET ORAL
Qty: 0 | Refills: 0 | DISCHARGE

## 2020-10-27 RX ORDER — SODIUM CHLORIDE 9 MG/ML
1000 INJECTION INTRAMUSCULAR; INTRAVENOUS; SUBCUTANEOUS
Refills: 0 | Status: DISCONTINUED | OUTPATIENT
Start: 2020-10-27 | End: 2020-10-27

## 2020-10-27 RX ORDER — INFLUENZA VIRUS VACCINE 15; 15; 15; 15 UG/.5ML; UG/.5ML; UG/.5ML; UG/.5ML
0.5 SUSPENSION INTRAMUSCULAR ONCE
Refills: 0 | Status: DISCONTINUED | OUTPATIENT
Start: 2020-10-27 | End: 2020-10-27

## 2020-10-27 RX ORDER — ZOLPIDEM TARTRATE 10 MG/1
1 TABLET ORAL
Qty: 0 | Refills: 0 | DISCHARGE

## 2020-10-27 RX ADMIN — SODIUM CHLORIDE 500 MILLILITER(S): 9 INJECTION INTRAMUSCULAR; INTRAVENOUS; SUBCUTANEOUS at 01:26

## 2020-10-27 RX ADMIN — PANTOPRAZOLE SODIUM 40 MILLIGRAM(S): 20 TABLET, DELAYED RELEASE ORAL at 05:34

## 2020-10-27 RX ADMIN — ZOLPIDEM TARTRATE 5 MILLIGRAM(S): 10 TABLET ORAL at 01:26

## 2020-10-27 RX ADMIN — Medication 81 MILLIGRAM(S): at 11:25

## 2020-10-27 NOTE — DISCHARGE NOTE PROVIDER - NSDCMRMEDTOKEN_GEN_ALL_CORE_FT
Adderall 10 mg oral tablet: 1 tab(s) orally 4 times a day, As Needed  Ambien 10 mg oral tablet: 1 tab(s) orally once a day (at bedtime), As Needed  aspirin 81 mg oral delayed release tablet: 1 tab(s) orally once a day  atorvastatin 20 mg oral tablet: 1 tab(s) orally once a day   Eliquis 5 mg oral tablet: 1 tab(s) orally 2 times a day  Lasix 20 mg oral tablet: 1 tab(s) orally once a day  pantoprazole 40 mg oral delayed release tablet: 1 tab(s) orally once a day (before a meal)

## 2020-10-27 NOTE — DISCHARGE NOTE PROVIDER - CARE PROVIDER_API CALL
Roni Tolbert)  Cardiology; Internal Medicine; Interventional Cardiology  [address] [place] [address]  Phone: [phone]  Fax: [phone]  Established Patient  Follow Up Time: 1 week    PMD,   Phone: (   )    -  Fax: (   )    -  Established Patient  Follow Up Time:

## 2020-10-27 NOTE — DISCHARGE NOTE PROVIDER - PROVIDER TOKENS
PROVIDER:[TOKEN:[[id number]],FOLLOWUP:[1 week],ESTABLISHEDPATIENT:[T]],FREE:[LAST:[PMD],PHONE:[(   )    -],FAX:[(   )    -],ESTABLISHEDPATIENT:[T]]

## 2020-10-27 NOTE — PATIENT PROFILE ADULT - PUBLIC BENEFITS
Quality 110: Preventive Care And Screening: Influenza Immunization: Influenza Immunization previously received during influenza season
Detail Level: Detailed
no

## 2020-10-27 NOTE — PROGRESS NOTE ADULT - SUBJECTIVE AND OBJECTIVE BOX
[place] [facility], THE HEART CENTER                                   [address] [place] [address]                                                      PHONE: [phone]                                                         FAX: [phone]  [email or web address]  ---------------------------------------------------------------------------------------------------------------------------------    Overnight events/patient complaints:  Patient feeling well overnigth.  No further episodes of cheat pain.  No dyspnea at rest or orthopnea.     PAST MEDICAL & SURGICAL HISTORY:  Multiple subsegmental pulmonary emboli without acute cor pulmonale    Acute deep vein thrombosis (DVT) of right lower extremity, unspecified vein    Diverticulitis    S/P colon resection  (7/2018)    S/P nasal septoplasty        Zosyn (Hives)    MEDICATIONS  (STANDING):  aspirin enteric coated 81 milliGRAM(s) Oral daily  influenza   Vaccine 0.5 milliLiter(s) IntraMuscular once  pantoprazole    Tablet 40 milliGRAM(s) Oral before breakfast  sodium chloride 0.9%. 1000 milliLiter(s) (75 mL/Hr) IV Continuous <Continuous>    MEDICATIONS  (PRN):  amphetamine/dextroamphetamine 10 milliGRAM(s) Oral two times a day PRN ADHD and/or fatigue symptoms  zolpidem 5 milliGRAM(s) Oral at bedtime PRN Insomnia  zolpidem 5 milliGRAM(s) Oral at bedtime PRN Insomnia      Vital Signs Last 24 Hrs  T(C): 36.4 (27 Oct 2020 07:40), Max: 36.8 (26 Oct 2020 18:21)  T(F): 97.5 (27 Oct 2020 07:40), Max: 98.2 (26 Oct 2020 18:21)  HR: 72 (27 Oct 2020 07:40) (63 - 83)  BP: 121/84 (27 Oct 2020 07:40) (101/65 - 131/89)  BP(mean): --  RR: 18 (27 Oct 2020 07:40) (18 - 18)  SpO2: 98% (27 Oct 2020 07:40) (94% - 98%)  ICU Vital Signs Last 24 Hrs  DACOSTA TEJINDER  I&O's Detail    I&O's Summary    Drug Dosing Weight  OLESYA MARR      PHYSICAL EXAM:  General: Appears well developed, well nourished alert and cooperative.  HEENT: Head; normocephalic, atraumatic.  Eyes: Pupils reactive, cornea wnl.  Neck: Supple, no nodes adenopathy, no NVD or carotid bruit or thyromegaly.  CARDIOVASCULAR: Normal S1 and S2, No murmur, rub, gallop or lift.   LUNGS: No rales, rhonchi or wheeze. Normal breath sounds bilaterally.  ABDOMEN: Soft, nontender without mass or organomegaly. bowel sounds normoactive.  EXTREMITIES: No clubbing, cyanosis or edema. Distal pulses wnl.   SKIN: warm and dry with normal turgor.  NEURO: Alert/oriented x 3/normal motor exam.   PSYCH: normal affect.        LABS:                        14.8   7.35  )-----------( 211      ( 26 Oct 2020 21:11 )             45.3     10-27    143  |  107  |  31.0<H>  ----------------------------<  116<H>  3.7   |  24.0  |  1.51<H>    Ca    8.4<L>      27 Oct 2020 04:20  Mg     1.8     10-26    TPro  6.5<L>  /  Alb  3.9  /  TBili  0.5  /  DBili  x   /  AST  27  /  ALT  43<H>  /  AlkPhos  73  10-26    OLESYA MARR  CARDIAC MARKERS ( 27 Oct 2020 04:20 )  x     / <0.01 ng/mL / 275 U/L / x     / 3.0 ng/mL  CARDIAC MARKERS ( 26 Oct 2020 21:11 )  x     / <0.01 ng/mL / x     / x     / x          PT/INR - ( 26 Oct 2020 21:11 )   PT: 14.6 sec;   INR: 1.27 ratio               RADIOLOGY & ADDITIONAL STUDIES:    INTERPRETATION OF TELEMETRY (personally reviewed):    ECG: Sinus bradycardia, normal axis, no ST or T wave changes to suggest ischemia    ECHO: I reviewed the echo which shows normal LV function, no wall motion abnormalities.       ASSESSMENT AND PLAN:  In summary, OLESYA MARR is an 65y Male with past medical history significant for HLD, DVT/PE 10/2019 on eliquis, LE edema, obesity presents with chest pain for one day.    Chest pain- the CP was a/w GI upset and was relieved after vomiting.  He reports that it was not worse with exertion and not better with rest.  EKG was normal and he ruled out for MI.  Echo is normal without wall motion abn.    -at this point, can send the patient home and have him follow up in our office.  He can have scheduled stress test performed i n our office on Friday.  -continue asa 81mg daily     DVT/PE- would resume eliquis 5mg bid    HLD- c/w home dose of atorvastatin 20mg    LE edema- c/w lasix 20mg daily    DC to home    Thank you for allowing [facility] to participate in the care of this patient.  Please feel free to call with any questions or concerns.

## 2020-10-27 NOTE — DISCHARGE NOTE NURSING/CASE MANAGEMENT/SOCIAL WORK - NSDCVIVACCINE_GEN_ALL_CORE_FT
Medication/Dose: Blake De Jesus  Patient last seen by PCP: 10/24/2019  Next office visit with PCP: 1/27/2020  Last Lab: 10/18/2019    Above information requires contacting patient: No    Prescription does not require PDMP check    Sent to provider to approve or deny
No Vaccines Administered.

## 2020-10-27 NOTE — DISCHARGE NOTE NURSING/CASE MANAGEMENT/SOCIAL WORK - PATIENT PORTAL LINK FT
You can access the FollowMyHealth Patient Portal offered by [facility] by registering at the following website: [email or web address]. By joining [email or web address]’s FollowMyHealth portal, you will also be able to view your health information using other applications (apps) compatible with our system.

## 2020-10-27 NOTE — DISCHARGE NOTE PROVIDER - NSDCCPCAREPLAN_GEN_ALL_CORE_FT
PRINCIPAL DISCHARGE DIAGNOSIS  Diagnosis: ACS (acute coronary syndrome)  Assessment and Plan of Treatment: - your chest pain most likely related to stomack upset  - we rule MI for you, but you need to follow up with Cardiologist for stress test in his office      SECONDARY DISCHARGE DIAGNOSES  Diagnosis: CARMEN (acute kidney injury)  Assessment and Plan of Treatment: - please drink up to 2.5 L of fluids a day for next culple days  - follow up with your pcp on your kindey function

## 2020-10-27 NOTE — DISCHARGE NOTE PROVIDER - HOSPITAL COURSE
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  Pt reports intermittent episodes of midsternal chest pain occurring ‘for a while’ that he thought was related to indigestion and GERD. Earlier this morning, he had a severe episode of non-radiating, midsternal chest pressure that started after he woke up and lasted for several hours which prompted him to come to the ED. He was admitted to r/o ACS.   He was seen in consultation with Cardiologist Dr. Tolbert, and it was felt that CP was related to GI upset and was relieved by episode of vomiting. EKG was normal and he ruled out for MI.  Echo is normal without wall motion abn. At this point,  he was advise to follow up with Dr. Tolbert in office to scheduled stress test possible on 10/30.     In regards rest medical conditions:  CARMEN most likely due to poor po intake due to nausea/vomiting - o 10/27 pt declined further( renal US) denied any urinary symptoms and requested to be d/c, he was instructed to drink fluids up to 2.5 L /day and follow up with   primary care provider     DVT/PE-  c/w eliquis 5mg bid    HLD- c/w atorvastatin 20mg    LE edema- c/w lasix 20mg daily    Vital Signs Last 24 Hrs  T(C): 36.8 (27 Oct 2020 11:16), Max: 36.8 (26 Oct 2020 18:21)  T(F): 98.3 (27 Oct 2020 11:16), Max: 98.3 (27 Oct 2020 11:16)  HR: 59 (27 Oct 2020 11:16) (59 - 83)  BP: 117/72 (27 Oct 2020 11:16) (101/65 - 131/89)  BP(mean): --  RR: 18 (27 Oct 2020 11:16) (18 - 18)  SpO2: 97% (27 Oct 2020 11:16) (94% - 98%)     General: NAD, obese   ENMT: no conjunctival injection, moist oral mucoses, good dentition   Neck and Lymph: no palpable masses in thyroids, no JVD, no lymphadenopathy   Lungs: good air entry b/l, no wheezing/rails/crackles on auscultation, no stridor  CVS: RRR, no M/R/G, no peripheral edema, palpable pedal pulses +2  Abdomen: soft, neg for CVA tenderness b/l  Extremities: no apparent deformities, preserved ROM  Skin: warm, dry, no rashes,  Neuro: OAX3, didn't noted CN abnormalities during my exam, observed moving all 4 ext against gravity and cooperating with physical exam, no apparent loss of sensation.   Pschyc; appropriet thought process, mood, response    spent 35 min to coordinated d/c and  on d/c

## 2020-10-28 LAB
SARS-COV-2 IGG SERPL QL IA: NEGATIVE — SIGNIFICANT CHANGE UP
SARS-COV-2 IGM SERPL IA-ACNC: <3.8 AU/ML — SIGNIFICANT CHANGE UP

## 2020-12-01 ENCOUNTER — APPOINTMENT (OUTPATIENT)
Dept: PULMONOLOGY | Facility: CLINIC | Age: 65
End: 2020-12-01

## 2021-06-16 NOTE — ED STATDOCS - CADM POA CENTRAL LINE
Refill Approved    Rx renewed per Medication Renewal Policy. Medication was last renewed on 9/11/20.    Anthony Beach, [facility] Triage/Med Refill 3/28/2021     Requested Prescriptions   Pending Prescriptions Disp Refills     pioglitazone (ACTOS) 30 MG tablet 90 tablet 0     Sig: Take 0.5 tablets (15 mg total) by mouth daily.       Pioglitazone Protocol Passed - 3/26/2021 10:37 AM        Passed - Blood pressure in last 12 months     BP Readings from Last 1 Encounters:   11/09/20 120/76             Passed - LFT or AST or ALT in last 12 months     Albumin   Date Value Ref Range Status   11/09/2020 3.9 3.5 - 5.0 g/dL Final     Bilirubin, Total   Date Value Ref Range Status   11/09/2020 0.6 0.0 - 1.0 mg/dL Final     Bilirubin, Direct   Date Value Ref Range Status   01/04/2013 <0.1 <0.6 mg/dL Final     Alkaline Phosphatase   Date Value Ref Range Status   11/09/2020 120 45 - 120 U/L Final     AST   Date Value Ref Range Status   11/09/2020 26 0 - 40 U/L Final     ALT   Date Value Ref Range Status   11/09/2020 28 0 - 45 U/L Final     Protein, Total   Date Value Ref Range Status   11/09/2020 7.2 6.0 - 8.0 g/dL Final                Passed - Visit with PCP or prescribing provider visit in last 6 months      Last office visit with prescriber/PCP: 11/9/2020 OR same dept: 11/9/2020 Kylie Armijo MD OR same specialty: 11/9/2020 Kylie Armijo MD Last physical: Visit date not found Last MTM visit: Visit date not found         Next appt within 3 mo: Visit date not found  Next physical within 3 mo: Visit date not found  Prescriber OR PCP: Kylie Armijo MD  Last diagnosis associated with med order: 1. Type 2 diabetes mellitus (H)  - pioglitazone (ACTOS) 30 MG tablet; Take 0.5 tablets (15 mg total) by mouth daily.  Dispense: 90 tablet; Refill: 0     If protocol passes may refill for 12 months if within 3 months of last provider visit (or a total of 15 months).           Passed - A1C in last 6 months     Hemoglobin  A1c   Date Value Ref Range Status   11/09/2020 >14.0 (H) <=5.6 % Final     Comment:     Normal <5.7% Prediabete 5.7-6.4% Diabletes 6.5% or higher - adopted from ADA consensus guidelines               Passed - Microalbumin in last year     Microalbumin, Random Urine   Date Value Ref Range Status   11/09/2020 3.53 (H) 0.00 - 1.99 mg/dL Final                  Passed - Serum creatinine in last year     Creatinine   Date Value Ref Range Status   11/09/2020 1.44 (H) 0.70 - 1.30 mg/dL Final                             No

## 2021-08-10 NOTE — PROGRESS NOTE ADULT - PROVIDER SPECIALTY LIST ADULT
Anesthesia
Family Medicine
Pain Medicine
Pain Medicine
Surgery
Urology
Surgery
36 y/o pt 35.2 weeks  IUI pregnancy with known gestational hypertension presents with PPROM  AP complicated by:  -Fetal alert: RIGHT aorttic arch with left ductus arteriosus which makes up a vascular ring , the possibility of a double aortic arch cannot be completely ruled out, ECHO PP  -subchorionic hematoma resolved @ 9 weeks   -Gestational hypertension   d/w Dr Betts and Dr Bermudez  Plan:  -Admit l&d. Routine labs  -IOL PO cytotec  -Fetus: cat 1 tracing, vertex presentation, continuous monitoring, fetal echo PP   -Ampicillin for PPROM/GBS unknown  -Pain: Patient denies pain management  -GBS pending, urine culture pending   -Covid 19 pending for patient and support person  -Consents signed and witnessed at bedside

## 2022-01-02 NOTE — ED PROVIDER NOTE - CROS ED ENMT ALL NEG
negative...
You can access the FollowMyHealth Patient Portal offered by [facility] by registering at the following website: [email or web address]. By joining [employer]’s FollowMyHealth portal, you will also be able to view your health information using other applications (apps) compatible with our system.

## 2022-08-13 ENCOUNTER — EMERGENCY (EMERGENCY)
Facility: HOSPITAL | Age: 67
LOS: 1 days | End: 2022-08-13
Attending: EMERGENCY MEDICINE
Payer: MEDICARE

## 2022-08-13 VITALS
OXYGEN SATURATION: 98 % | DIASTOLIC BLOOD PRESSURE: 78 MMHG | RESPIRATION RATE: 18 BRPM | HEART RATE: 78 BPM | TEMPERATURE: 98 F | SYSTOLIC BLOOD PRESSURE: 122 MMHG

## 2022-08-13 VITALS
HEART RATE: 72 BPM | TEMPERATURE: 98 F | HEIGHT: 69 IN | RESPIRATION RATE: 20 BRPM | WEIGHT: 229.06 LBS | DIASTOLIC BLOOD PRESSURE: 81 MMHG | OXYGEN SATURATION: 97 % | SYSTOLIC BLOOD PRESSURE: 126 MMHG

## 2022-08-13 DIAGNOSIS — Z98.890 OTHER SPECIFIED POSTPROCEDURAL STATES: Chronic | ICD-10-CM

## 2022-08-13 DIAGNOSIS — Z90.49 ACQUIRED ABSENCE OF OTHER SPECIFIED PARTS OF DIGESTIVE TRACT: Chronic | ICD-10-CM

## 2022-08-13 LAB
ALBUMIN SERPL ELPH-MCNC: 3.8 G/DL — SIGNIFICANT CHANGE UP (ref 3.3–5.2)
ALP SERPL-CCNC: 74 U/L — SIGNIFICANT CHANGE UP (ref 40–120)
ALT FLD-CCNC: 18 U/L — SIGNIFICANT CHANGE UP
ANION GAP SERPL CALC-SCNC: 10 MMOL/L — SIGNIFICANT CHANGE UP (ref 5–17)
AST SERPL-CCNC: 21 U/L — SIGNIFICANT CHANGE UP
BASOPHILS # BLD AUTO: 0.04 K/UL — SIGNIFICANT CHANGE UP (ref 0–0.2)
BASOPHILS NFR BLD AUTO: 0.5 % — SIGNIFICANT CHANGE UP (ref 0–2)
BILIRUB SERPL-MCNC: 0.3 MG/DL — LOW (ref 0.4–2)
BUN SERPL-MCNC: 27.8 MG/DL — HIGH (ref 8–20)
CALCIUM SERPL-MCNC: 8.7 MG/DL — SIGNIFICANT CHANGE UP (ref 8.4–10.5)
CHLORIDE SERPL-SCNC: 109 MMOL/L — HIGH (ref 98–107)
CO2 SERPL-SCNC: 27 MMOL/L — SIGNIFICANT CHANGE UP (ref 22–29)
CREAT SERPL-MCNC: 1.67 MG/DL — HIGH (ref 0.5–1.3)
EGFR: 45 ML/MIN/1.73M2 — LOW
EOSINOPHIL # BLD AUTO: 0.1 K/UL — SIGNIFICANT CHANGE UP (ref 0–0.5)
EOSINOPHIL NFR BLD AUTO: 1.2 % — SIGNIFICANT CHANGE UP (ref 0–6)
GLUCOSE SERPL-MCNC: 109 MG/DL — HIGH (ref 70–99)
HCT VFR BLD CALC: 38.6 % — LOW (ref 39–50)
HGB BLD-MCNC: 12.9 G/DL — LOW (ref 13–17)
IMM GRANULOCYTES NFR BLD AUTO: 0.2 % — SIGNIFICANT CHANGE UP (ref 0–1.5)
LYMPHOCYTES # BLD AUTO: 1.86 K/UL — SIGNIFICANT CHANGE UP (ref 1–3.3)
LYMPHOCYTES # BLD AUTO: 22.1 % — SIGNIFICANT CHANGE UP (ref 13–44)
MCHC RBC-ENTMCNC: 30.9 PG — SIGNIFICANT CHANGE UP (ref 27–34)
MCHC RBC-ENTMCNC: 33.4 GM/DL — SIGNIFICANT CHANGE UP (ref 32–36)
MCV RBC AUTO: 92.6 FL — SIGNIFICANT CHANGE UP (ref 80–100)
MONOCYTES # BLD AUTO: 0.88 K/UL — SIGNIFICANT CHANGE UP (ref 0–0.9)
MONOCYTES NFR BLD AUTO: 10.4 % — SIGNIFICANT CHANGE UP (ref 2–14)
NEUTROPHILS # BLD AUTO: 5.53 K/UL — SIGNIFICANT CHANGE UP (ref 1.8–7.4)
NEUTROPHILS NFR BLD AUTO: 65.6 % — SIGNIFICANT CHANGE UP (ref 43–77)
PLATELET # BLD AUTO: 249 K/UL — SIGNIFICANT CHANGE UP (ref 150–400)
POTASSIUM SERPL-MCNC: 4.1 MMOL/L — SIGNIFICANT CHANGE UP (ref 3.5–5.3)
POTASSIUM SERPL-SCNC: 4.1 MMOL/L — SIGNIFICANT CHANGE UP (ref 3.5–5.3)
PROT SERPL-MCNC: 6 G/DL — LOW (ref 6.6–8.7)
RBC # BLD: 4.17 M/UL — LOW (ref 4.2–5.8)
RBC # FLD: 13.1 % — SIGNIFICANT CHANGE UP (ref 10.3–14.5)
SODIUM SERPL-SCNC: 146 MMOL/L — HIGH (ref 135–145)
TROPONIN T SERPL-MCNC: <0.01 NG/ML — SIGNIFICANT CHANGE UP (ref 0–0.06)
WBC # BLD: 8.43 K/UL — SIGNIFICANT CHANGE UP (ref 3.8–10.5)
WBC # FLD AUTO: 8.43 K/UL — SIGNIFICANT CHANGE UP (ref 3.8–10.5)

## 2022-08-13 PROCEDURE — 99285 EMERGENCY DEPT VISIT HI MDM: CPT

## 2022-08-13 PROCEDURE — 99285 EMERGENCY DEPT VISIT HI MDM: CPT | Mod: 25

## 2022-08-13 PROCEDURE — 82962 GLUCOSE BLOOD TEST: CPT

## 2022-08-13 PROCEDURE — 84484 ASSAY OF TROPONIN QUANT: CPT

## 2022-08-13 PROCEDURE — 36415 COLL VENOUS BLD VENIPUNCTURE: CPT

## 2022-08-13 PROCEDURE — 80053 COMPREHEN METABOLIC PANEL: CPT

## 2022-08-13 PROCEDURE — 93005 ELECTROCARDIOGRAM TRACING: CPT

## 2022-08-13 PROCEDURE — U0005: CPT

## 2022-08-13 PROCEDURE — 85025 COMPLETE CBC W/AUTO DIFF WBC: CPT

## 2022-08-13 PROCEDURE — U0003: CPT

## 2022-08-13 PROCEDURE — 71046 X-RAY EXAM CHEST 2 VIEWS: CPT

## 2022-08-13 PROCEDURE — 93010 ELECTROCARDIOGRAM REPORT: CPT

## 2022-08-13 PROCEDURE — 71046 X-RAY EXAM CHEST 2 VIEWS: CPT | Mod: 26

## 2022-08-13 RX ORDER — SODIUM CHLORIDE 9 MG/ML
1000 INJECTION INTRAMUSCULAR; INTRAVENOUS; SUBCUTANEOUS ONCE
Refills: 0 | Status: COMPLETED | OUTPATIENT
Start: 2022-08-13 | End: 2022-08-13

## 2022-08-13 RX ADMIN — SODIUM CHLORIDE 1000 MILLILITER(S): 9 INJECTION INTRAMUSCULAR; INTRAVENOUS; SUBCUTANEOUS at 21:08

## 2022-08-13 NOTE — ED PROVIDER NOTE - ATTENDING CONTRIBUTION TO CARE
Patient is a 66yo M with PMHx of DVT/PE on eliquis, ADHD, GERD who presents to the ED complaining of an episode of syncope around 2pm today. He reports he was sitting in a chair, pulling on a stuck tube, felt a sharp pain in his left arm, felt dizzy, then "came to" to his wife and son looking at him. Did not fall, was sitting in the chair the whole time. Patient complained of feeling "off" afterwards but has been improving.  Wife reports that patient was sitting in a really hot room (80-90degrees) and hasn't drank very much water today. Denies headache, fever, chills, abdominal pain, chest pain, SOB.    Episode occurred around 2 pm. Normal neuro exam. no no signs of trauma. ambulating in ED here. acs workup neg. cxr neg. feeling as at baseline. improved after IVF.s table for dc with outpatient follow up. All results discussed with the patient, and a copy of results has been provided. Patient is comfortable with dc plan for home. Opportunity for questions was provided and all questions have been addressed. Patient understands when to return to ED if symptoms worsen.

## 2022-08-13 NOTE — ED PROVIDER NOTE - NS ED ROS FT
General: No fever, chills.  EENT: No vision changes, hearing changes, nasal congestion, throat pain, difficulty swallowing.  Respiratory: No SOB, cough, wheezing.  Cardiac: No chest pain, palpitations. + lower extremity edema (chronic).  GI: No abdominal pain, nausea, vomiting, diarrhea, constipation.  : No difficulty with urination, pain with urination, hematuria.  Skin: No rashes.  Neuro: No headache, dizziness, lightheadedness.  MSK: + muscle pain (left arm pain). No joint pain, back pain.  Psych: No known mental health issues.  Endocrine: No heat/cold intolerance, no polyuria/polydipsia.  Heme: No easy bruising or bleeding.  Allergic: No pruritis, dermatitis, or environmental allergies.

## 2022-08-13 NOTE — ED PROVIDER NOTE - OBJECTIVE STATEMENT
Patient is a 66yo M with PMHx of DVT/PE on eliquis, ADHD, GERD who presents to the ED complaining of an episode of syncope around 2pm today. He reports he was sitting in a chair, pulling on a stuck tube, felt a sharp pain in his left arm, felt dizzy, then "came to" to his wife and son looking at him. Did not fall, was sitting in the chair the whole time. Patient complains of still feeling "off."  Wife reports that patient was sitting in a really hot room (80-90degrees) and hasn't drank very much water today. Denies headache, fever, chills, abdominal pain, chest pain, SOB. Patient is a 68yo M with PMHx of DVT/PE on eliquis, ADHD, GERD who presents to the ED complaining of an episode of syncope around 2pm today. He reports he was sitting in a chair, pulling on a stuck tube, felt a sharp pain in his left arm, felt dizzy, then "came to" to his wife and son looking at him. Did not fall, was sitting in the chair the whole time. Patient complained of feeling "off" afterwards but has been improving.  Wife reports that patient was sitting in a really hot room (80-90degrees) and hasn't drank very much water today. Denies headache, fever, chills, abdominal pain, chest pain, SOB.

## 2022-08-13 NOTE — ED ADULT TRIAGE NOTE - CHIEF COMPLAINT QUOTE
C/o syncopal episode. Denies hitting head. Pt states, "I just feel off now". Hx of DVT and PE (on Eliquis).

## 2022-08-13 NOTE — ED PROVIDER NOTE - PHYSICAL EXAMINATION
Gen: laying comfortably in NAD   Head: NCAT  ENT: EOMI. No exudates  CV: RRR. Audible S1 and S2. No murmurs. 2+ radial and PT pulses   Pulm: Clear to auscultation bilaterally. No wheezes, rales, or rhonchi  Abd: soft, normoactive BS x4, NTND, no rebound, no guarding  Musculoskeletal:  No peripheral edema, no calf ttp  Neurologic: AAOx3, no focal deficits- strength and sensation intact UE and LE BL  : no CVA tenderness

## 2022-08-13 NOTE — ED PROVIDER NOTE - CLINICAL SUMMARY MEDICAL DECISION MAKING FREE TEXT BOX
Patient is a 68yo M with PMHx of DVT/PE on eliquis, ADHD, GERD who presents to the ED complaining of an episode of syncope/presyncope around 2pm today. Patient is a 68yo M with PMHx of DVT/PE on eliquis, ADHD, GERD who presents to the ED complaining of an episode of syncope/presyncope around 2pm today. Cardiac workup including EKG, CXR, and trop were negative. COVID results pending.

## 2022-08-13 NOTE — ED PROVIDER NOTE - PATIENT PORTAL LINK FT
You can access the FollowMyHealth Patient Portal offered by [facility] by registering at the following website: [email or web address]. By joining [email or web address]’s FollowMyHealth portal, you will also be able to view your health information using other applications (apps) compatible with our system.

## 2022-08-13 NOTE — ED PROVIDER NOTE - PROGRESS NOTE DETAILS
Fan- Reevaluated patient at bedside; patient reports improvement in symptoms with IV fluids. Discussed lab results including elevated creatinine and importance of finding and addressing the cause. Patient voiced understanding and agreement.

## 2022-08-13 NOTE — ED PROVIDER NOTE - NSFOLLOWUPINSTRUCTIONS_ED_ALL_ED_FT
- Follow up with your cardiologist for further work up of your syncope.  - Follow up with your primary care doctor and inform them of your elevated creatinine (1.51 in 2020, 1.67 today) so they can determine if you need follow up with a nephrologist.    Chest Pain    Chest pain can be caused by many different conditions which may or may not be dangerous. Causes include heartburn, lung infections, heart attack, blood clot in lungs, skin infections, strain or damage to muscle, cartilage, or bones, etc. In addition to a history and physical examination, an electrocardiogram (ECG) or other lab tests may have been performed to determine the cause of your chest pain. Follow up with your primary care provider or with a cardiologist as instructed.     SEEK IMMEDIATE MEDICAL CARE IF YOU HAVE ANY OF THE FOLLOWING SYMPTOMS: worsening chest pain, coughing up blood, unexplained back/neck/jaw pain, severe abdominal pain, dizziness or lightheadedness, fainting, shortness of breath, sweaty or clammy skin, vomiting, or racing heart beat. These symptoms may represent a serious problem that is an emergency. Do not wait to see if the symptoms will go away. Get medical help right away. Call 911 and do not drive yourself to the hospital.

## 2022-08-14 LAB — SARS-COV-2 RNA SPEC QL NAA+PROBE: SIGNIFICANT CHANGE UP

## 2022-11-15 NOTE — ED ADULT NURSE NOTE - CAS EDN DISCHARGE INTERVENTIONS
11/15/22 1027   Post-Acute Status   Post-Acute Authorization Hospice   Hospice Status Set-up Complete/Auth obtained  (Home with [facility])     SW spoke with Kary with [facility] ([phone]). She reported the wife wanted the DME delivered today at 11 so they are good with transport being set up. Completed pfc orders and advised RN of pending transport.     Ros Figueroa, ORW  Neurocritical Care   [facility]  [id number]     IV discontinued, cath removed intact

## 2022-11-21 NOTE — H&P ADULT - NSHPOUTPATIENTPROVIDERS_GEN_ALL_CORE
-- DO NOT REPLY / DO NOT REPLY ALL --  -- Message is from Engagement Center [employer] (EC[employer]) --    General Patient Message      Patients wants know if PCP in office today and requesting call back     Caller Information       Type Contact Phone/Fax    11/21/2022 11:47 AM CST Phone (Incoming) Lobo Marsh (Self) [phone] (M)        Alternative phone number: none  Can a detailed message be left? Yes    Message Turnaround: [place]:    Refer to site's KB page for routing instructions    Please give this turnaround time to the caller:   \"You can expect to receive a response 1-3 business days after your provider's clinical team reviews the message\"               PMD: Dr. Gonzalez

## 2022-12-09 ENCOUNTER — EMERGENCY (EMERGENCY)
Facility: HOSPITAL | Age: 67
LOS: 1 days | Discharge: HOME | End: 2022-12-09
Attending: STUDENT IN AN ORGANIZED HEALTH CARE EDUCATION/TRAINING PROGRAM
Payer: MEDICARE

## 2022-12-09 VITALS
SYSTOLIC BLOOD PRESSURE: 116 MMHG | TEMPERATURE: 98 F | OXYGEN SATURATION: 95 % | HEART RATE: 82 BPM | DIASTOLIC BLOOD PRESSURE: 78 MMHG | RESPIRATION RATE: 18 BRPM | WEIGHT: 246.92 LBS | HEIGHT: 69 IN

## 2022-12-09 VITALS
SYSTOLIC BLOOD PRESSURE: 123 MMHG | RESPIRATION RATE: 16 BRPM | OXYGEN SATURATION: 98 % | TEMPERATURE: 98 F | DIASTOLIC BLOOD PRESSURE: 84 MMHG | HEART RATE: 77 BPM

## 2022-12-09 DIAGNOSIS — Z98.890 OTHER SPECIFIED POSTPROCEDURAL STATES: Chronic | ICD-10-CM

## 2022-12-09 DIAGNOSIS — Z90.49 ACQUIRED ABSENCE OF OTHER SPECIFIED PARTS OF DIGESTIVE TRACT: Chronic | ICD-10-CM

## 2022-12-09 LAB
ALBUMIN SERPL ELPH-MCNC: 4.3 G/DL — SIGNIFICANT CHANGE UP (ref 3.3–5.2)
ALP SERPL-CCNC: 90 U/L — SIGNIFICANT CHANGE UP (ref 40–120)
ALT FLD-CCNC: 29 U/L — SIGNIFICANT CHANGE UP
ANION GAP SERPL CALC-SCNC: 10 MMOL/L — SIGNIFICANT CHANGE UP (ref 5–17)
ANISOCYTOSIS BLD QL: SLIGHT — SIGNIFICANT CHANGE UP
APPEARANCE UR: CLEAR — SIGNIFICANT CHANGE UP
AST SERPL-CCNC: 20 U/L — SIGNIFICANT CHANGE UP
BACTERIA # UR AUTO: ABNORMAL
BASOPHILS # BLD AUTO: 0 K/UL — SIGNIFICANT CHANGE UP (ref 0–0.2)
BASOPHILS NFR BLD AUTO: 0 % — SIGNIFICANT CHANGE UP (ref 0–2)
BILIRUB SERPL-MCNC: 0.7 MG/DL — SIGNIFICANT CHANGE UP (ref 0.4–2)
BILIRUB UR-MCNC: NEGATIVE — SIGNIFICANT CHANGE UP
BUN SERPL-MCNC: 29.5 MG/DL — HIGH (ref 8–20)
CALCIUM SERPL-MCNC: 9.7 MG/DL — SIGNIFICANT CHANGE UP (ref 8.4–10.5)
CHLORIDE SERPL-SCNC: 105 MMOL/L — SIGNIFICANT CHANGE UP (ref 96–108)
CO2 SERPL-SCNC: 26 MMOL/L — SIGNIFICANT CHANGE UP (ref 22–29)
COLOR SPEC: YELLOW — SIGNIFICANT CHANGE UP
CREAT SERPL-MCNC: 1.5 MG/DL — HIGH (ref 0.5–1.3)
DACRYOCYTES BLD QL SMEAR: SLIGHT — SIGNIFICANT CHANGE UP
DIFF PNL FLD: ABNORMAL
EGFR: 51 ML/MIN/1.73M2 — LOW
EOSINOPHIL # BLD AUTO: 0 K/UL — SIGNIFICANT CHANGE UP (ref 0–0.5)
EOSINOPHIL NFR BLD AUTO: 0 % — SIGNIFICANT CHANGE UP (ref 0–6)
EPI CELLS # UR: SIGNIFICANT CHANGE UP
GLUCOSE SERPL-MCNC: 151 MG/DL — HIGH (ref 70–99)
GLUCOSE UR QL: NEGATIVE MG/DL — SIGNIFICANT CHANGE UP
HCT VFR BLD CALC: 47.4 % — SIGNIFICANT CHANGE UP (ref 39–50)
HGB BLD-MCNC: 15.3 G/DL — SIGNIFICANT CHANGE UP (ref 13–17)
KETONES UR-MCNC: ABNORMAL
LEUKOCYTE ESTERASE UR-ACNC: ABNORMAL
LIDOCAIN IGE QN: 42 U/L — SIGNIFICANT CHANGE UP (ref 22–51)
LYMPHOCYTES # BLD AUTO: 0.14 K/UL — LOW (ref 1–3.3)
LYMPHOCYTES # BLD AUTO: 0.9 % — LOW (ref 13–44)
MACROCYTES BLD QL: SLIGHT — SIGNIFICANT CHANGE UP
MANUAL SMEAR VERIFICATION: SIGNIFICANT CHANGE UP
MCHC RBC-ENTMCNC: 29.5 PG — SIGNIFICANT CHANGE UP (ref 27–34)
MCHC RBC-ENTMCNC: 32.3 GM/DL — SIGNIFICANT CHANGE UP (ref 32–36)
MCV RBC AUTO: 91.3 FL — SIGNIFICANT CHANGE UP (ref 80–100)
MONOCYTES # BLD AUTO: 0.27 K/UL — SIGNIFICANT CHANGE UP (ref 0–0.9)
MONOCYTES NFR BLD AUTO: 1.7 % — LOW (ref 2–14)
NEUTROPHILS # BLD AUTO: 15.37 K/UL — HIGH (ref 1.8–7.4)
NEUTROPHILS NFR BLD AUTO: 92.2 % — HIGH (ref 43–77)
NEUTS BAND # BLD: 4.3 % — SIGNIFICANT CHANGE UP (ref 0–8)
NITRITE UR-MCNC: NEGATIVE — SIGNIFICANT CHANGE UP
OVALOCYTES BLD QL SMEAR: SLIGHT — SIGNIFICANT CHANGE UP
PH UR: 5 — SIGNIFICANT CHANGE UP (ref 5–8)
PLAT MORPH BLD: NORMAL — SIGNIFICANT CHANGE UP
PLATELET # BLD AUTO: 275 K/UL — SIGNIFICANT CHANGE UP (ref 150–400)
POIKILOCYTOSIS BLD QL AUTO: SLIGHT — SIGNIFICANT CHANGE UP
POLYCHROMASIA BLD QL SMEAR: SLIGHT — SIGNIFICANT CHANGE UP
POTASSIUM SERPL-MCNC: 4.4 MMOL/L — SIGNIFICANT CHANGE UP (ref 3.5–5.3)
POTASSIUM SERPL-SCNC: 4.4 MMOL/L — SIGNIFICANT CHANGE UP (ref 3.5–5.3)
PROT SERPL-MCNC: 7.2 G/DL — SIGNIFICANT CHANGE UP (ref 6.6–8.7)
PROT UR-MCNC: 30 MG/DL
RBC # BLD: 5.19 M/UL — SIGNIFICANT CHANGE UP (ref 4.2–5.8)
RBC # FLD: 12.8 % — SIGNIFICANT CHANGE UP (ref 10.3–14.5)
RBC BLD AUTO: ABNORMAL
RBC CASTS # UR COMP ASSIST: SIGNIFICANT CHANGE UP /HPF (ref 0–4)
SODIUM SERPL-SCNC: 141 MMOL/L — SIGNIFICANT CHANGE UP (ref 135–145)
SP GR SPEC: 1.02 — SIGNIFICANT CHANGE UP (ref 1.01–1.02)
UROBILINOGEN FLD QL: NEGATIVE MG/DL — SIGNIFICANT CHANGE UP
VARIANT LYMPHS # BLD: 0.9 % — SIGNIFICANT CHANGE UP (ref 0–6)
WBC # BLD: 15.93 K/UL — HIGH (ref 3.8–10.5)
WBC # FLD AUTO: 15.93 K/UL — HIGH (ref 3.8–10.5)
WBC UR QL: SIGNIFICANT CHANGE UP /HPF (ref 0–5)

## 2022-12-09 PROCEDURE — 99285 EMERGENCY DEPT VISIT HI MDM: CPT

## 2022-12-09 PROCEDURE — 96365 THER/PROPH/DIAG IV INF INIT: CPT | Mod: XU

## 2022-12-09 PROCEDURE — 80053 COMPREHEN METABOLIC PANEL: CPT

## 2022-12-09 PROCEDURE — 36415 COLL VENOUS BLD VENIPUNCTURE: CPT

## 2022-12-09 PROCEDURE — 74177 CT ABD & PELVIS W/CONTRAST: CPT | Mod: MA

## 2022-12-09 PROCEDURE — 87086 URINE CULTURE/COLONY COUNT: CPT

## 2022-12-09 PROCEDURE — 85025 COMPLETE CBC W/AUTO DIFF WBC: CPT

## 2022-12-09 PROCEDURE — 96376 TX/PRO/DX INJ SAME DRUG ADON: CPT

## 2022-12-09 PROCEDURE — 81001 URINALYSIS AUTO W/SCOPE: CPT

## 2022-12-09 PROCEDURE — 99284 EMERGENCY DEPT VISIT MOD MDM: CPT | Mod: 25

## 2022-12-09 PROCEDURE — 74177 CT ABD & PELVIS W/CONTRAST: CPT | Mod: 26,MA

## 2022-12-09 PROCEDURE — 83690 ASSAY OF LIPASE: CPT

## 2022-12-09 PROCEDURE — 96375 TX/PRO/DX INJ NEW DRUG ADDON: CPT

## 2022-12-09 RX ORDER — KETOROLAC TROMETHAMINE 30 MG/ML
15 SYRINGE (ML) INJECTION ONCE
Refills: 0 | Status: DISCONTINUED | OUTPATIENT
Start: 2022-12-09 | End: 2022-12-09

## 2022-12-09 RX ORDER — ONDANSETRON 8 MG/1
1 TABLET, FILM COATED ORAL
Qty: 6 | Refills: 0
Start: 2022-12-09

## 2022-12-09 RX ORDER — SODIUM CHLORIDE 9 MG/ML
1000 INJECTION INTRAMUSCULAR; INTRAVENOUS; SUBCUTANEOUS ONCE
Refills: 0 | Status: COMPLETED | OUTPATIENT
Start: 2022-12-09 | End: 2022-12-09

## 2022-12-09 RX ORDER — MORPHINE SULFATE 50 MG/1
4 CAPSULE, EXTENDED RELEASE ORAL ONCE
Refills: 0 | Status: DISCONTINUED | OUTPATIENT
Start: 2022-12-09 | End: 2022-12-09

## 2022-12-09 RX ORDER — ACETAMINOPHEN 500 MG
1000 TABLET ORAL ONCE
Refills: 0 | Status: COMPLETED | OUTPATIENT
Start: 2022-12-09 | End: 2022-12-09

## 2022-12-09 RX ORDER — ONDANSETRON 8 MG/1
8 TABLET, FILM COATED ORAL ONCE
Refills: 0 | Status: COMPLETED | OUTPATIENT
Start: 2022-12-09 | End: 2022-12-09

## 2022-12-09 RX ADMIN — Medication 15 MILLIGRAM(S): at 10:59

## 2022-12-09 RX ADMIN — MORPHINE SULFATE 4 MILLIGRAM(S): 50 CAPSULE, EXTENDED RELEASE ORAL at 14:45

## 2022-12-09 RX ADMIN — SODIUM CHLORIDE 1000 MILLILITER(S): 9 INJECTION INTRAMUSCULAR; INTRAVENOUS; SUBCUTANEOUS at 11:07

## 2022-12-09 RX ADMIN — Medication 1000 MILLIGRAM(S): at 14:13

## 2022-12-09 RX ADMIN — SODIUM CHLORIDE 1000 MILLILITER(S): 9 INJECTION INTRAMUSCULAR; INTRAVENOUS; SUBCUTANEOUS at 14:13

## 2022-12-09 RX ADMIN — Medication 400 MILLIGRAM(S): at 13:17

## 2022-12-09 RX ADMIN — MORPHINE SULFATE 4 MILLIGRAM(S): 50 CAPSULE, EXTENDED RELEASE ORAL at 14:25

## 2022-12-09 RX ADMIN — MORPHINE SULFATE 4 MILLIGRAM(S): 50 CAPSULE, EXTENDED RELEASE ORAL at 10:58

## 2022-12-09 RX ADMIN — MORPHINE SULFATE 4 MILLIGRAM(S): 50 CAPSULE, EXTENDED RELEASE ORAL at 11:07

## 2022-12-09 RX ADMIN — Medication 15 MILLIGRAM(S): at 11:07

## 2022-12-09 RX ADMIN — SODIUM CHLORIDE 1000 MILLILITER(S): 9 INJECTION INTRAMUSCULAR; INTRAVENOUS; SUBCUTANEOUS at 10:55

## 2022-12-09 RX ADMIN — ONDANSETRON 8 MILLIGRAM(S): 8 TABLET, FILM COATED ORAL at 10:57

## 2022-12-09 RX ADMIN — MORPHINE SULFATE 4 MILLIGRAM(S): 50 CAPSULE, EXTENDED RELEASE ORAL at 14:13

## 2022-12-09 RX ADMIN — MORPHINE SULFATE 4 MILLIGRAM(S): 50 CAPSULE, EXTENDED RELEASE ORAL at 15:01

## 2022-12-09 RX ADMIN — SODIUM CHLORIDE 1000 MILLILITER(S): 9 INJECTION INTRAMUSCULAR; INTRAVENOUS; SUBCUTANEOUS at 14:26

## 2022-12-09 NOTE — ED PROVIDER NOTE - PROGRESS NOTE DETAILS
Noted Cr 1.5.  D/W ct, will proceed with iv contrast.  benefits outweigh risks in this patient with hx of abd surgery and acute abd pain and distention.  Will continue to hydrate post ct PA note  Pt is seen by Dr Encarnacion initially agreed With hx, PE and plan   seen the pt at the bed side feeling better after med - pending UA and CT -Ct result no acute finding  seen the pt at the bed side NAd - result explained about the CT finding - will po challenge the pt if tolerating will d.c feeling better denies any pain or nausea willdc

## 2022-12-09 NOTE — ED ADULT NURSE NOTE - CAS DISCH CONDITION
spoke with Katie at [facility] to obtain update regarding pt case as no  name or number was provided on Friday during visit with pt. Katie took writer's information and shared looking into pt case and reaching out to pt's  and the worker's supervisor to contact [facility] immediately to discuss discharge. Writer shared pt is scheduled for discharge as there is no medical necessity warranting stay.  provided updates in pt feeling unsafe returning home, not speaking with family since admission, and numerous staff being unable to reach family regarding discharge (phone going immediately to voicemail, both numbers). Katie took down writer's office number as well as unit 2 front-desk number.    Taylor Julian, MEHRDAD  7/26/21   Stable

## 2022-12-09 NOTE — ED PROVIDER NOTE - OBJECTIVE STATEMENT
68 y/o male with PMHx of diverticulitis, DVT on Eliquis, and pulmonary emboli presents to ED for abdominal pain. At 7am, pt states he felt like he was going to pass out driving car to [place]. Pt has right sided abdominal pain radiating to back, started vomiting up dinner from last night. Pt took 2 gasX pills. Pt vomited again on the way to ED. Pt has not been feeling well last night. Denies testicular pain. No Hx kidney stones. Allergy to Zosec. Denies fevers or chills.

## 2022-12-09 NOTE — ED PROVIDER NOTE - NSFOLLOWUPINSTRUCTIONS_ED_ALL_ED_FT
please call and follow up with primary care within 48 hours and bring the results   continue home medication     Abdominal Pain, Adult  Many things can cause belly (abdominal) pain. Most times, belly pain is not dangerous. Many cases of belly pain can be watched and treated at home. Sometimes, though, belly pain is serious. Your doctor will try to find the cause of your belly pain.  Follow these instructions at home:     Mediines   •Take over-the-counter and prescription medicines only as told by your doctor.  • Do not take medicines that help you poop (laxatives) unless told by your doctor.  General instructions   •Watch your belly pain for any changes.  drink enough fluid to keep your pee (urine) pale yellow.  •Keep all follow-up visits as told by your doctor. This is important.  Contact a doctor if:  •Your belly pain changes or gets worse.  •You are not hungry, or you lose weight without trying.  •You are having trouble pooping (constipated) or have watery poop (diarrhea) for more than 2–3 days.  •You have pain when you pee or poop.  •Your belly pain wakes you up at night.  •Your pain gets worse with meals, after eating, or with certain foods.  •You are vomiting and cannot keep anything down.  •You have a fever.  •You have blood in your pee.  Gethelp right away if:  Your pain does not go away as soon as your doctor says it should.  •You cannot stop vomiting.  •Your pain is only in areas of your belly, such as the right side or the left lower part of the belly.  •You have bloody or black poop, or poop that looks like tar.  •You have very bad pain, cramping, or bloating in your belly.  •You have signs of not having enough fluid or water in your body (dehydration), such as:  •Dark pee, very little pee, or no pee.  •Cracked lips.  •Dry mouth.  •Sunken eyes.  •Sleepiness.  •Wakness.  •You have trouble breathing or chest pain.  Summary  •Many cases of belly pain can be watched and treated at home.  •Wtch your belly pain for any changes.  •Take over-the-counter and prescription medicines only as told by your doctor.  •Contact a doctor if your belly pain changes or gets worse.  •Get help right away if you have very bad pain, cramping, or bloating in your belly.  This information is not intended to replace advice given to you by your health care provider. Make sure you discuss any questions you have with your health care provider

## 2022-12-09 NOTE — ED PROVIDER NOTE - PATIENT PORTAL LINK FT
You can access the FollowMyHealth Patient Portal offered by [facility] by registering at the following website: [email or web address]. By joining [employer]’s FollowMyHealth portal, you will also be able to view your health information using other applications (apps) compatible with our system.

## 2022-12-10 LAB
CULTURE RESULTS: SIGNIFICANT CHANGE UP
SPECIMEN SOURCE: SIGNIFICANT CHANGE UP

## 2023-08-02 NOTE — ED STATDOCS - CHPI ED RADIATION
45 YO man with a PMH of T2DM, HTN, previous CVA with minimal residual deficits, current smoker who presented to [facility] ED on 7/24 after two weeks of intermittent left lower extremity weakness with accompanying back pain,  and dysarthria. He was found to have acute infarct in left inferior adrienne and smaller acute infarcts in bilateral parieto-occipital juxtacortical . Hospital course s/f HTN, depression and suicidal ideation seen and cleared by psychologist. Patient now with gait Instability, ADL impairments and Functional impairments.    #H/o CVA, infarct in left inferior adrienne and smaller acute infarcts in bilateral parieto-occipital juxtacortical. Mechanism large artery atherosclerosis  - ASA 81mg and Plavix 75mg daily for 3 months, then ASA alone  - Lipitor 80mg qhs    #HTN  - BP acceptable  - Carvedilol 25mg Q 12hrs  - Amlodipine 10mg daily  - Hydralazine 25mg BID  - Lisinopril 20mg daily    #HLD  - Lipitor 80mg daily    #DM II  - ISS and FS  - A1c 7.0 on 7/25   - cont with metformin 500mg BID    DVT ppx Lovenox   none

## 2024-05-12 ENCOUNTER — EMERGENCY (EMERGENCY)
Facility: HOSPITAL | Age: 69
LOS: 1 days | Discharge: HOME | End: 2024-05-12
Attending: EMERGENCY MEDICINE
Payer: MEDICARE

## 2024-05-12 VITALS
HEART RATE: 75 BPM | SYSTOLIC BLOOD PRESSURE: 112 MMHG | RESPIRATION RATE: 18 BRPM | TEMPERATURE: 98 F | OXYGEN SATURATION: 97 % | DIASTOLIC BLOOD PRESSURE: 75 MMHG

## 2024-05-12 VITALS
RESPIRATION RATE: 18 BRPM | OXYGEN SATURATION: 98 % | WEIGHT: 246.7 LBS | TEMPERATURE: 98 F | DIASTOLIC BLOOD PRESSURE: 89 MMHG | SYSTOLIC BLOOD PRESSURE: 134 MMHG | HEART RATE: 75 BPM | HEIGHT: 69 IN

## 2024-05-12 DIAGNOSIS — Z98.890 OTHER SPECIFIED POSTPROCEDURAL STATES: Chronic | ICD-10-CM

## 2024-05-12 DIAGNOSIS — Z90.49 ACQUIRED ABSENCE OF OTHER SPECIFIED PARTS OF DIGESTIVE TRACT: Chronic | ICD-10-CM

## 2024-05-12 PROCEDURE — 73200 CT UPPER EXTREMITY W/O DYE: CPT | Mod: MC

## 2024-05-12 PROCEDURE — 99284 EMERGENCY DEPT VISIT MOD MDM: CPT

## 2024-05-12 PROCEDURE — 73060 X-RAY EXAM OF HUMERUS: CPT

## 2024-05-12 PROCEDURE — 99284 EMERGENCY DEPT VISIT MOD MDM: CPT | Mod: 25

## 2024-05-12 PROCEDURE — 73090 X-RAY EXAM OF FOREARM: CPT | Mod: 26,LT

## 2024-05-12 PROCEDURE — 73080 X-RAY EXAM OF ELBOW: CPT | Mod: 26,RT

## 2024-05-12 PROCEDURE — 73090 X-RAY EXAM OF FOREARM: CPT

## 2024-05-12 PROCEDURE — 73080 X-RAY EXAM OF ELBOW: CPT

## 2024-05-12 PROCEDURE — 73200 CT UPPER EXTREMITY W/O DYE: CPT | Mod: 26,RT,MC

## 2024-05-12 PROCEDURE — 73110 X-RAY EXAM OF WRIST: CPT | Mod: 26,RT

## 2024-05-12 PROCEDURE — 73060 X-RAY EXAM OF HUMERUS: CPT | Mod: 26,RT

## 2024-05-12 PROCEDURE — 73110 X-RAY EXAM OF WRIST: CPT

## 2024-05-12 RX ORDER — OXYCODONE HYDROCHLORIDE 5 MG/1
1 TABLET ORAL
Qty: 9 | Refills: 0
Start: 2024-05-12 | End: 2024-05-14

## 2024-05-12 NOTE — ED PROVIDER NOTE - PHYSICAL EXAMINATION
Const: AOX3 nontoxic appearing, no apparent respiratory or physical distress. Stable gait   HEENT: NC/AT. Moist mucous membranes.  Eyes: LUH. EOMI  Neck: Soft and supple. Full ROM without pain.  Cardiac: Regular rate and regular rhythm. +S1/S2.\  Resp: Speaking in full sentences. No evidence of respiratory distress. N  Skin: No rashes, abrasions or lacerations.   right upper extremities no bony tenderness to palpation noted over right shoulder   right humerus mildly tenderness at the distal aspect   right elbow no bony tenderness to palpation noted range of motion grossly intact   right humerus proximal aspect  mild tenderness to palpation noted   right wrist no bony tenderness to palpation noted negative snuffbox /radialis radial pulse +2 and  grossly intact  Neuro: Awake, alert & oriented x 3. Moves all extremities symmetrically. Const: AOX3 nontoxic appearing, no apparent respiratory or physical distress. Stable gait   HEENT: NC/AT. Moist mucous membranes.  Eyes: LUH. EOMI  Neck: Soft and supple. Full ROM without pain.  Cardiac: Regular rate and regular rhythm. +S1/S2.\  Resp: Speaking in full sentences. No evidence of respiratory distress. N  Skin: No rashes, abrasions or lacerations.   right upper extremities no bony tenderness to palpation noted over right shoulder   right humerus mildly tenderness at the distal aspect no bony deformity noted   right elbow no bony tenderness to palpation noted range of motion grossly intact   right  forearm proximal aspect  mild tenderness  over the soft tissue  noted, no obvious deformity noted   right wrist no bony tenderness to palpation noted negative snuffbox /radialis radial pulse +2 and  grossly intact  Neuro: Awake, alert & oriented x 3. Moves all extremities symmetrically. Const: AOX3 nontoxic appearing, no apparent respiratory or physical distress. Stable gait   HEENT: NC/AT. Moist mucous membranes.  Eyes: LUH. EOMI  Neck: Soft and supple. Full ROM without pain.  Cardiac: Regular rate and regular rhythm. +S1/S2.\  Resp: Speaking in full sentences. No evidence of respiratory distress. N  Skin: No rashes, abrasions or lacerations. RUE compartment soft    right upper extremities no bony tenderness to palpation noted over right shoulder   right humerus mildly tenderness at the distal aspect no bony deformity noted   right elbow no bony tenderness to palpation noted range of motion grossly intact   right  forearm proximal aspect  mild tenderness  over the soft tissue  noted, no obvious deformity noted   right wrist no bony tenderness to palpation noted negative snuffbox /radialis radial pulse +2 and  grossly intact  Neuro: Awake, alert & oriented x 3. Moves all extremities symmetrically.

## 2024-05-12 NOTE — ED PROVIDER NOTE - ATTENDING APP SHARED VISIT CONTRIBUTION OF CARE
I personally saw the patient with the JAYSON, and completed the key components of the history and physical exam. I then discussed the management plan with the JAYSON.

## 2024-05-12 NOTE — ED ADULT NURSE REASSESSMENT NOTE - NS ED NURSE REASSESS COMMENT FT1
Patient A&Ox4, resting on stretcher in no acute distress. Continues to endorse pain to RUE, positive sensation, pulse and movement to distal extremity.  Imaging results pending.  Safety maintained.

## 2024-05-12 NOTE — ED PROVIDER NOTE - OBJECTIVE STATEMENT
70 y/o male with PMHx of diverticulitis, DVT on Eliquis, and pulmonary emboli Presents emergency room complaining  of right forearm pain status post tripped over the cabin fell on his right hand foosh  about 1 or 2 hours PTA.  no head trauma no loss of consciousness and severe pain in mid distal right forearm. denies any neck pain chest pain or abdominal pain. did not take any medications. patient is right-handed dominant

## 2024-05-12 NOTE — ED PROVIDER NOTE - PROGRESS NOTE DETAILS
With read of the x-ray without acute finding we will go ahead and get CT scan of the right upper extremity STEPH [place]:   FOOS RIGHT ARM. xray reviewed no acute fracture visualized, pain to interior proximal forearm, FROM elbow and wrist. swelling to proximal inner forearm, compartments soft. distal pulse intact and sensation intact distally. FROM of elbow.   ice pack applied and ace applied   pending CT-r ct-r no fracture small amount of fluid   dc with fu with ortho   return precautions advised

## 2024-05-12 NOTE — ED PROVIDER NOTE - CARE PROVIDER_API CALL
April Chau  Orthopaedic Surgery  [address] [place] [address]  Phone: [phone]  Fax: [phone]  Follow Up Time: 1-3 Days

## 2024-05-12 NOTE — ED ADULT NURSE REASSESSMENT NOTE - NS ED NURSE REASSESS COMMENT FT1
Assumed care of pt from previous RN. Pt resting in bed A&Ox4 c/o right forearm pain, PA contacted for more pain medicine. Pt VSS, NAD noted, RR even and unlabored. Pt Edema noted distal to the right elbow, no redness or warmth to touch noted. Bed in lowest locked position, able to make needs known.

## 2024-05-12 NOTE — ED ADULT TRIAGE NOTE - CHIEF COMPLAINT QUOTE
pt states he fell 1.5 hours ago, tripped on a curb, landed on right arm, c/o forearm pain  A&Ox3, resp wnl, holding right arm

## 2024-05-12 NOTE — ED PROVIDER NOTE - NSFOLLOWUPINSTRUCTIONS_ED_ALL_ED_FT
keep the Keep the sling on  Tylenol over-the-counter 650 mg every 6 hours as needed for the pain3  - cold compress over the area  : Follow-up with orthopedic doctor   we will call you back if anything changes on x-ray result finding keep the Keep the sling on for 3 days when up and moving,  Tylenol over-the-counter 650 mg every 6 hours as needed for the pain  - cold compress over the area and warp with ace for comfort

## 2024-05-12 NOTE — ED PROVIDER NOTE - CLINICAL SUMMARY MEDICAL DECISION MAKING FREE TEXT BOX
68 y/o male with PMHx of diverticulitis, DVT on Eliquis, and pulmonary emboli Presents emergency room complaining  of right forearm pain status post tripped over the cabin fell on his right hand foosh  about 1 or 2 hours PTA.  no head trauma no loss of consciousness and severe pain in mid distal right forearm. denies any neck pain chest pain or abdominal pain. did not take any medications. patient is right-handed dominant  - x-ray of right forearm elbow wrist    Percocet 5 mg reevaluation 70 y/o male with PMHx of diverticulitis, DVT on Eliquis, and pulmonary emboli Presents emergency room complaining  of right forearm pain status post tripped over the cabin fell on his right hand foosh  about 1 or 2 hours PTA.  no head trauma no loss of consciousness and severe pain in mid distal right forearm. denies any neck pain chest pain or abdominal pain. did not take any medications. patient is right-handed dominant  - x-ray of right forearm/ elbow /wrist  and humerus   Percocet 5 mg reevaluation

## 2024-05-12 NOTE — ED ADULT NURSE NOTE - OBJECTIVE STATEMENT
Patient presents with c/o right forearm pain, s/p mechanical fall.  A&Ox4, denies head strike, positive pulse, movement and sensation to RUE.

## 2024-07-05 ENCOUNTER — OFFICE (OUTPATIENT)
Dept: URBAN - METROPOLITAN AREA CLINIC 115 | Facility: CLINIC | Age: 69
Setting detail: OPHTHALMOLOGY
End: 2024-07-05
Payer: MEDICAID

## 2024-07-05 DIAGNOSIS — H43.393: ICD-10-CM

## 2024-07-05 DIAGNOSIS — H25.13: ICD-10-CM

## 2024-07-05 DIAGNOSIS — H16.223: ICD-10-CM

## 2024-07-05 PROCEDURE — 92250 FUNDUS PHOTOGRAPHY W/I&R: CPT | Performed by: OPTOMETRIST

## 2024-07-05 PROCEDURE — 92004 COMPRE OPH EXAM NEW PT 1/>: CPT | Performed by: OPTOMETRIST

## 2024-07-05 ASSESSMENT — CONFRONTATIONAL VISUAL FIELD TEST (CVF)
OD_FINDINGS: FULL
OS_FINDINGS: FULL

## 2025-02-25 NOTE — PATIENT PROFILE ADULT. - FALLEN IN THE PAST
Received call from [facility] pharmacy and spoke with pharmacist Tian.    Reviewed that patient previously on Ozempic 2 mg, highest dose. Switching now to Mounjaro 5mg dose. Provider is aware.    Pharmacy verbalized understanding.    Closing Encounter.   
no